# Patient Record
Sex: MALE | Race: WHITE | NOT HISPANIC OR LATINO | Employment: OTHER | URBAN - NONMETROPOLITAN AREA
[De-identification: names, ages, dates, MRNs, and addresses within clinical notes are randomized per-mention and may not be internally consistent; named-entity substitution may affect disease eponyms.]

---

## 2017-02-28 DIAGNOSIS — C61 PROSTATE CANCER (H): Primary | ICD-10-CM

## 2017-02-28 LAB — PSA SERPL-MCNC: 1.92 UG/L (ref 0–4)

## 2017-02-28 PROCEDURE — 84153 ASSAY OF PSA TOTAL: CPT | Performed by: UROLOGY

## 2017-02-28 PROCEDURE — 36415 COLL VENOUS BLD VENIPUNCTURE: CPT | Performed by: UROLOGY

## 2017-03-09 ENCOUNTER — TRANSFERRED RECORDS (OUTPATIENT)
Dept: HEALTH INFORMATION MANAGEMENT | Facility: HOSPITAL | Age: 67
End: 2017-03-09

## 2017-10-12 DIAGNOSIS — C61 PROSTATE CANCER (H): Primary | ICD-10-CM

## 2017-10-12 LAB — PSA SERPL-ACNC: 2.03 UG/L (ref 0–4)

## 2017-10-12 PROCEDURE — 84153 ASSAY OF PSA TOTAL: CPT | Performed by: UROLOGY

## 2017-10-19 ENCOUNTER — TRANSFERRED RECORDS (OUTPATIENT)
Dept: HEALTH INFORMATION MANAGEMENT | Facility: HOSPITAL | Age: 67
End: 2017-10-19

## 2017-11-30 ENCOUNTER — TRANSFERRED RECORDS (OUTPATIENT)
Dept: HEALTH INFORMATION MANAGEMENT | Facility: HOSPITAL | Age: 67
End: 2017-11-30

## 2018-05-08 ENCOUNTER — TRANSFERRED RECORDS (OUTPATIENT)
Dept: HEALTH INFORMATION MANAGEMENT | Facility: CLINIC | Age: 68
End: 2018-05-08

## 2018-06-04 DIAGNOSIS — C61 PROSTATE CANCER (H): Primary | ICD-10-CM

## 2018-06-04 LAB — PSA SERPL-ACNC: 2.33 UG/L (ref 0–4)

## 2018-06-04 PROCEDURE — 84153 ASSAY OF PSA TOTAL: CPT | Performed by: UROLOGY

## 2018-10-08 NOTE — PROGRESS NOTES
SUBJECTIVE:   Augie Hancock is a 67 year old male who presents for Preventive Visit.      Are you in the first 12 months of your Medicare Part B coverage?  No    Healthy Habits:    Do you get at least three servings of calcium containing foods daily (dairy, green leafy vegetables, etc.)? yes    Amount of exercise or daily activities, outside of work: 3 day(s) per week    Problems taking medications regularly No    Medication side effects: No    Have you had an eye exam in the past two years? yes    Do you see a dentist twice per year? yes    Do you have sleep apnea, excessive snoring or daytime drowsiness? snoring      Ability to successfully perform activities of daily living: Yes, no assistance needed    Home safety:  none identified     Hearing impairment: No    Fall risk:  Fallen 2 or more times in the past year?: No  Any fall with injury in the past year?: No        COGNITIVE SCREEN  1) Repeat 3 items (Leader, Season, Table)    2) Clock draw: NORMAL  3) 3 item recall: Recalls 1 object   Results: NORMAL clock, 1-2 items recalled: COGNITIVE IMPAIRMENT LESS LIKELY    Mini-CogTM Copyright CATALINA Vaz. Licensed by the author for use in [facility]; reprinted with permission (becky@[email or web address]). All rights reserved.              Reviewed and updated as needed this visit by clinical staff         Reviewed and updated as needed this visit by Provider        Social History   Substance Use Topics     Smoking status: Never Smoker     Smokeless tobacco: Current User     Types: Chew      Comment: no passive exposure     Alcohol use No       If you drink alcohol do you typically have >3 drinks per day or >7 drinks per week? No                        Today's PHQ-2 Score:   PHQ-2 ( 1999 Pfizer) 6/12/2015   Q1: Little interest or pleasure in doing things 0   Q2: Feeling down, depressed or hopeless 0   PHQ-2 Score 0       Do you feel safe in your environment - Yes    Do you have a Health Care Directive?: Yes: Patient  "states has Advance Directive and will bring in a copy to clinic.    Current providers sharing in care for this patient include:   Patient Care Team:  Jose Guadalupe Cotton MD as PCP - General    The following health maintenance items are reviewed in Epic and correct as of today:  Health Maintenance   Topic Date Due     PHQ-2 Q1 YR  12/10/1964     HIV SCREEN (SYSTEM ASSIGNED)  12/10/1970     HEPATITIS C SCREENING  12/10/1970     FALL RISK ASSESSMENT  12/10/2017     PNEUMOCOCCAL (1 of 2 - PCV13) 12/10/2017     AORTIC ANEURYSM SCREENING (SYSTEM ASSIGNED)  12/10/2017     INFLUENZA VACCINE (1) 09/01/2018     LIPID SCREEN Q5 YR MALE (SYSTEM ASSIGNED)  12/30/2020     ADVANCE DIRECTIVE PLANNING Q5 YRS  09/21/2021     COLONOSCOPY Q10 YR  12/17/2022     TETANUS IMMUNIZATION (SYSTEM ASSIGNED)  05/29/2025     Labs reviewed in EPIC        ROS:  CONSTITUTIONAL: NEGATIVE for fever, chills, change in weight  INTEGUMENTARY/SKIN: NEGATIVE for worrisome rashes, moles or lesions  EYES: NEGATIVE for vision changes or irritation  ENT/MOUTH: NEGATIVE for ear, mouth and throat problems  RESP: NEGATIVE for significant cough or SOB  BREAST: NEGATIVE for masses, tenderness or discharge  CV: NEGATIVE for chest pain, palpitations or peripheral edema  GI: NEGATIVE for nausea, abdominal pain, heartburn, or change in bowel habits  : NEGATIVE for frequency, dysuria, or hematuria  MUSCULOSKELETAL: NEGATIVE for significant arthralgias or myalgia  NEURO: NEGATIVE for weakness, dizziness or paresthesias  ENDOCRINE: NEGATIVE for temperature intolerance, skin/hair changes  HEME: NEGATIVE for bleeding problems  PSYCHIATRIC: NEGATIVE for changes in mood or affect    OBJECTIVE:   There were no vitals taken for this visit. Estimated body mass index is 21.3 kg/(m^2) as calculated from the following:    Height as of 9/21/16: 5' 5\" (1.651 m).    Weight as of 9/21/16: 128 lb (58.1 kg).  EXAM:   GENERAL: healthy, alert and no distress  EYES: Eyes grossly normal to " inspection, PERRL and conjunctivae and sclerae normal  HENT: ear canals and TM's normal, nose and mouth without ulcers or lesions  NECK: no adenopathy, no asymmetry, masses, or scars and thyroid normal to palpation  RESP: lungs clear to auscultation - no rales, rhonchi or wheezes  CV: regular rate and rhythm, normal S1 S2, no S3 or S4, no murmur, click or rub, no peripheral edema and peripheral pulses strong  ABDOMEN: soft, nontender, no hepatosplenomegaly, no masses and bowel sounds normal   (male): normal male genitalia without lesions or urethral discharge, no hernia  RECTAL: normal sphincter tone, no rectal masses and right sided prostate nodule noted and feels unchanged.  MS: no gross musculoskeletal defects noted, no edema  SKIN: no suspicious lesions or rashes  NEURO: Normal strength and tone, mentation intact and speech normal  PSYCH: mentation appears normal, affect normal/bright    Diagnostic Test Results:  none     ASSESSMENT / PLAN:       ICD-10-CM    1. Routine general medical examination at a health care facility Z00.00 Lipid Profile     Comprehensive metabolic panel     Prostate spec antigen screen     CBC with platelets and differential   2. Screening for AAA (abdominal aortic aneurysm) Z13.6 US Aorta Medicare AAA Screening   3. Screening for HIV (human immunodeficiency virus) Z11.4 HIV Antigen Antibody Combo   4. Need for hepatitis C screening test Z11.59 Hepatitis C Screen Reflex to HCV RNA Quant and Genotype   5. Prostate nodule N40.2    6. Need for prophylactic vaccination and inoculation against influenza Z23 HC FLU VACCINE, INCREASED ANTIGEN, PRESV FREE     ADMIN INFLUENZA (For MEDICARE Patients ONLY) []       End of Life Planning:  Patient currently has an advanced directive:     COUNSELING:  Reviewed preventive health counseling, as reflected in patient instructions    BP Readings from Last 1 Encounters:   09/21/16 100/58     Estimated body mass index is 21.3 kg/(m^2) as calculated  "from the following:    Height as of 9/21/16: 5' 5\" (1.651 m).    Weight as of 9/21/16: 128 lb (58.1 kg).           reports that he has never smoked. His smokeless tobacco use includes Chew.      Appropriate preventive services were discussed with this patient, including applicable screening as appropriate for cardiovascular disease, diabetes, osteopenia/osteoporosis, and glaucoma.  As appropriate for age/gender, discussed screening for colorectal cancer, prostate cancer, breast cancer, and cervical cancer. Checklist reviewing preventive services available has been given to the patient.    Reviewed patients plan of care and provided an AVS. The Basic Care Plan (routine screening as documented in Health Maintenance) for Augie meets the Care Plan requirement. This Care Plan has been established and reviewed with the Patient.    Counseling Resources:  ATP IV Guidelines  Pooled Cohorts Equation Calculator  Breast Cancer Risk Calculator  FRAX Risk Assessment  ICSI Preventive Guidelines  Dietary Guidelines for Americans, 2010  USDA's MyPlate  ASA Prophylaxis  Lung CA Screening    Jose Guadalupe Cotton MD  [facility]  "

## 2018-10-09 ENCOUNTER — OFFICE VISIT (OUTPATIENT)
Dept: FAMILY MEDICINE | Facility: OTHER | Age: 68
End: 2018-10-09
Attending: FAMILY MEDICINE
Payer: COMMERCIAL

## 2018-10-09 VITALS
DIASTOLIC BLOOD PRESSURE: 64 MMHG | HEART RATE: 62 BPM | WEIGHT: 131 LBS | SYSTOLIC BLOOD PRESSURE: 108 MMHG | BODY MASS INDEX: 21.83 KG/M2 | OXYGEN SATURATION: 98 % | HEIGHT: 65 IN | TEMPERATURE: 97.7 F

## 2018-10-09 DIAGNOSIS — Z23 NEED FOR PROPHYLACTIC VACCINATION AND INOCULATION AGAINST INFLUENZA: ICD-10-CM

## 2018-10-09 DIAGNOSIS — Z11.59 NEED FOR HEPATITIS C SCREENING TEST: ICD-10-CM

## 2018-10-09 DIAGNOSIS — N40.2 PROSTATE NODULE: ICD-10-CM

## 2018-10-09 DIAGNOSIS — Z00.00 ROUTINE GENERAL MEDICAL EXAMINATION AT A HEALTH CARE FACILITY: Primary | ICD-10-CM

## 2018-10-09 DIAGNOSIS — Z13.6 SCREENING FOR AAA (ABDOMINAL AORTIC ANEURYSM): ICD-10-CM

## 2018-10-09 DIAGNOSIS — Z11.4 SCREENING FOR HIV (HUMAN IMMUNODEFICIENCY VIRUS): ICD-10-CM

## 2018-10-09 LAB
ALBUMIN SERPL-MCNC: 4 G/DL (ref 3.4–5)
ALP SERPL-CCNC: 54 U/L (ref 40–150)
ALT SERPL W P-5'-P-CCNC: 16 U/L (ref 0–70)
ANION GAP SERPL CALCULATED.3IONS-SCNC: 7 MMOL/L (ref 3–14)
AST SERPL W P-5'-P-CCNC: 16 U/L (ref 0–45)
BASOPHILS # BLD AUTO: 0 10E9/L (ref 0–0.2)
BASOPHILS NFR BLD AUTO: 0.2 %
BILIRUB SERPL-MCNC: 0.6 MG/DL (ref 0.2–1.3)
BUN SERPL-MCNC: 19 MG/DL (ref 7–30)
CALCIUM SERPL-MCNC: 8.5 MG/DL (ref 8.5–10.1)
CHLORIDE SERPL-SCNC: 104 MMOL/L (ref 94–109)
CHOLEST SERPL-MCNC: 184 MG/DL
CO2 SERPL-SCNC: 28 MMOL/L (ref 20–32)
CREAT SERPL-MCNC: 0.83 MG/DL (ref 0.66–1.25)
DIFFERENTIAL METHOD BLD: NORMAL
EOSINOPHIL # BLD AUTO: 0.1 10E9/L (ref 0–0.7)
EOSINOPHIL NFR BLD AUTO: 1.8 %
ERYTHROCYTE [DISTWIDTH] IN BLOOD BY AUTOMATED COUNT: 13.1 % (ref 10–15)
GFR SERPL CREATININE-BSD FRML MDRD: >90 ML/MIN/1.7M2
GLUCOSE SERPL-MCNC: 86 MG/DL (ref 70–99)
HCT VFR BLD AUTO: 43.5 % (ref 40–53)
HDLC SERPL-MCNC: 66 MG/DL
HGB BLD-MCNC: 14.9 G/DL (ref 13.3–17.7)
LDLC SERPL CALC-MCNC: 104 MG/DL
LYMPHOCYTES # BLD AUTO: 1.2 10E9/L (ref 0.8–5.3)
LYMPHOCYTES NFR BLD AUTO: 23.5 %
MCH RBC QN AUTO: 30.3 PG (ref 26.5–33)
MCHC RBC AUTO-ENTMCNC: 34.3 G/DL (ref 31.5–36.5)
MCV RBC AUTO: 89 FL (ref 78–100)
MONOCYTES # BLD AUTO: 0.5 10E9/L (ref 0–1.3)
MONOCYTES NFR BLD AUTO: 10.3 %
NEUTROPHILS # BLD AUTO: 3.2 10E9/L (ref 1.6–8.3)
NEUTROPHILS NFR BLD AUTO: 64.2 %
NONHDLC SERPL-MCNC: 118 MG/DL
PLATELET # BLD AUTO: 201 10E9/L (ref 150–450)
POTASSIUM SERPL-SCNC: 4.1 MMOL/L (ref 3.4–5.3)
PROT SERPL-MCNC: 7 G/DL (ref 6.8–8.8)
PSA SERPL-ACNC: 2.32 UG/L (ref 0–4)
RBC # BLD AUTO: 4.91 10E12/L (ref 4.4–5.9)
SODIUM SERPL-SCNC: 139 MMOL/L (ref 133–144)
TRIGL SERPL-MCNC: 70 MG/DL
WBC # BLD AUTO: 5 10E9/L (ref 4–11)

## 2018-10-09 PROCEDURE — 80061 LIPID PANEL: CPT | Performed by: FAMILY MEDICINE

## 2018-10-09 PROCEDURE — 90471 IMMUNIZATION ADMIN: CPT | Performed by: FAMILY MEDICINE

## 2018-10-09 PROCEDURE — 85025 COMPLETE CBC W/AUTO DIFF WBC: CPT | Performed by: FAMILY MEDICINE

## 2018-10-09 PROCEDURE — 90662 IIV NO PRSV INCREASED AG IM: CPT | Performed by: FAMILY MEDICINE

## 2018-10-09 PROCEDURE — 86803 HEPATITIS C AB TEST: CPT | Mod: 90 | Performed by: FAMILY MEDICINE

## 2018-10-09 PROCEDURE — 87389 HIV-1 AG W/HIV-1&-2 AB AG IA: CPT | Mod: 90 | Performed by: FAMILY MEDICINE

## 2018-10-09 PROCEDURE — 84153 ASSAY OF PSA TOTAL: CPT | Performed by: FAMILY MEDICINE

## 2018-10-09 PROCEDURE — 99000 SPECIMEN HANDLING OFFICE-LAB: CPT | Performed by: FAMILY MEDICINE

## 2018-10-09 PROCEDURE — 99397 PER PM REEVAL EST PAT 65+ YR: CPT | Mod: 25 | Performed by: FAMILY MEDICINE

## 2018-10-09 PROCEDURE — 80053 COMPREHEN METABOLIC PANEL: CPT | Performed by: FAMILY MEDICINE

## 2018-10-09 PROCEDURE — 36415 COLL VENOUS BLD VENIPUNCTURE: CPT | Performed by: FAMILY MEDICINE

## 2018-10-09 ASSESSMENT — ANXIETY QUESTIONNAIRES
GAD7 TOTAL SCORE: 0
1. FEELING NERVOUS, ANXIOUS, OR ON EDGE: NOT AT ALL
6. BECOMING EASILY ANNOYED OR IRRITABLE: NOT AT ALL
2. NOT BEING ABLE TO STOP OR CONTROL WORRYING: NOT AT ALL
7. FEELING AFRAID AS IF SOMETHING AWFUL MIGHT HAPPEN: NOT AT ALL
5. BEING SO RESTLESS THAT IT IS HARD TO SIT STILL: NOT AT ALL
3. WORRYING TOO MUCH ABOUT DIFFERENT THINGS: NOT AT ALL

## 2018-10-09 ASSESSMENT — PAIN SCALES - GENERAL: PAINLEVEL: NO PAIN (0)

## 2018-10-09 ASSESSMENT — PATIENT HEALTH QUESTIONNAIRE - PHQ9: 5. POOR APPETITE OR OVEREATING: NOT AT ALL

## 2018-10-09 NOTE — NURSING NOTE
"Chief Complaint   Patient presents with     Physical       Initial /64  Pulse 62  Temp 97.7  F (36.5  C)  Ht 5' 5\" (1.651 m)  Wt 131 lb (59.4 kg)  SpO2 98%  BMI 21.8 kg/m2 Estimated body mass index is 21.8 kg/(m^2) as calculated from the following:    Height as of this encounter: 5' 5\" (1.651 m).    Weight as of this encounter: 131 lb (59.4 kg).  Medication Reconciliation: complete    Julia Cardoza LPN  "

## 2018-10-09 NOTE — LETTER
October 12, 2018      Augie Hancock  [address]  [place] [address]        Dear ,    We are writing to inform you of your test results.    Your test results fall within the expected range(s) or remain unchanged from previous results.  Please continue with current treatment plan.    Resulted Orders   Lipid Profile   Result Value Ref Range    Cholesterol 184 <200 mg/dL    Triglycerides 70 <150 mg/dL      Comment:      Fasting specimen    HDL Cholesterol 66 >39 mg/dL    LDL Cholesterol Calculated 104 (H) <100 mg/dL      Comment:      Above desirable:  100-129 mg/dl  Borderline High:  130-159 mg/dL  High:             160-189 mg/dL  Very high:       >189 mg/dl      Non HDL Cholesterol 118 <130 mg/dL   Comprehensive metabolic panel   Result Value Ref Range    Sodium 139 133 - 144 mmol/L    Potassium 4.1 3.4 - 5.3 mmol/L    Chloride 104 94 - 109 mmol/L    Carbon Dioxide 28 20 - 32 mmol/L    Anion Gap 7 3 - 14 mmol/L    Glucose 86 70 - 99 mg/dL      Comment:      Fasting specimen    Urea Nitrogen 19 7 - 30 mg/dL    Creatinine 0.83 0.66 - 1.25 mg/dL    GFR Estimate >90 >60 mL/min/1.7m2      Comment:      Non  GFR Calc    GFR Estimate If Black >90 >60 mL/min/1.7m2      Comment:       GFR Calc    Calcium 8.5 8.5 - 10.1 mg/dL    Bilirubin Total 0.6 0.2 - 1.3 mg/dL    Albumin 4.0 3.4 - 5.0 g/dL    Protein Total 7.0 6.8 - 8.8 g/dL    Alkaline Phosphatase 54 40 - 150 U/L    ALT 16 0 - 70 U/L    AST 16 0 - 45 U/L   Hepatitis C Screen Reflex to HCV RNA Quant and Genotype   Result Value Ref Range    Hepatitis C Antibody Nonreactive NR^Nonreactive      Comment:      Assay performance characteristics have not been established for newborns,   infants, and children     HIV Antigen Antibody Combo   Result Value Ref Range    HIV Antigen Antibody Combo Nonreactive NR^Nonreactive          Comment:      HIV-1 p24 Ag & HIV-1/HIV-2 Ab Not Detected   Prostate spec antigen screen   Result Value  Ref Range    PSA 2.32 0 - 4 ug/L      Comment:      Assay Method:  Chemiluminescence using Siemens Vista analyzer   CBC with platelets and differential   Result Value Ref Range    WBC 5.0 4.0 - 11.0 10e9/L    RBC Count 4.91 4.4 - 5.9 10e12/L    Hemoglobin 14.9 13.3 - 17.7 g/dL    Hematocrit 43.5 40.0 - 53.0 %    MCV 89 78 - 100 fl    MCH 30.3 26.5 - 33.0 pg    MCHC 34.3 31.5 - 36.5 g/dL    RDW 13.1 10.0 - 15.0 %    Platelet Count 201 150 - 450 10e9/L    % Neutrophils 64.2 %    % Lymphocytes 23.5 %    % Monocytes 10.3 %    % Eosinophils 1.8 %    % Basophils 0.2 %    Absolute Neutrophil 3.2 1.6 - 8.3 10e9/L    Absolute Lymphocytes 1.2 0.8 - 5.3 10e9/L    Absolute Monocytes 0.5 0.0 - 1.3 10e9/L    Absolute Eosinophils 0.1 0.0 - 0.7 10e9/L    Absolute Basophils 0.0 0.0 - 0.2 10e9/L    Diff Method Automated Method        If you have any questions or concerns, please call the clinic at the number listed above.       Sincerely,        Jose Guadalupe Cotton MD

## 2018-10-09 NOTE — MR AVS SNAPSHOT
After Visit Summary   10/9/2018    Augie Hancock    MRN: [id number]           Patient Information     Date Of Birth          1950        Visit Information        Provider Department      10/9/2018 10:30 AM Jose Guadalupe Cotton MD [facility]        Today's Diagnoses     Routine general medical examination at a health care facility    -  1    Screening for AAA (abdominal aortic aneurysm)        Screening for HIV (human immunodeficiency virus)        Need for hepatitis C screening test        Prostate nodule        Need for prophylactic vaccination and inoculation against influenza          Care Instructions      Preventive Health Recommendations:   Male Ages 65 and over    Yearly exam:             See your health care provider every year in order to  o   Review health changes.   o   Discuss preventive care.    o   Review your medicines if your doctor has prescribed any.    Talk with your health care provider about whether you should have a test to screen for prostate cancer (PSA).    Every 3 years, have a diabetes test (fasting glucose). If you are at risk for diabetes, you should have this test more often.    Every 5 years, have a cholesterol test. Have this test more often if you are at risk for high cholesterol or heart disease.     Every 10 years, have a colonoscopy. Or, have a yearly FIT test (stool test). These exams will check for colon cancer.    Talk to with your health care provider about screening for Abdominal Aortic Aneurysm if you have a family history of AAA or have a history of smoking.    Shots:     Get a flu shot each year.     Get a tetanus shot every 10 years.     Talk to your doctor about your pneumonia vaccines. There are now two you should receive - Pneumovax (PPSV 23) and Prevnar (PCV 13).     Talk to your pharmacist about a shingles vaccine.     Talk to your doctor about the hepatitis B vaccine.  Nutrition:     Eat at least 5 servings of fruits and  vegetables each day.     Eat whole-grain bread, whole-wheat pasta and brown rice instead of white grains and rice.     Get adequate Calcium and Vitamin D.   Lifestyle    Exercise for at least 150 minutes a week (30 minutes a day, 5 days a week). This will help you control your weight and prevent disease.     Limit alcohol to one drink per day.     No smoking.     Wear sunscreen to prevent skin cancer.     See your dentist every six months for an exam and cleaning.     See your eye doctor every 1 to 2 years to screen for conditions such as glaucoma, macular degeneration, cataracts, etc           Follow-ups after your visit        Your next 10 appointments already scheduled     Oct 17, 2018  9:00 AM CDT   (Arrive by 8:45 AM)   US AORTA MEDICARE AAA SCREENING with HIUS1   [facility] ULTRASOUND ([facility] )    [address] [place] [address]   [phone]           How do I prepare for my exam? (Food and drink instructions) Adults: No eating, smoking, gum chewing or drinking for 8 hours before the exam. You may take medicine with a small sip of water.  Children: * Infants, breast-fed: may have breast milk up to 2 hours before exam. * Infants, formula: may have bottle until 4 hours before exam. * Children 1-5 years: No food or drink for 4 hours before exam. * Children 6 -12 years: No food or drink for 6 hours before exam. * Children over 12 years: No food or drink for 8 hours before exam.  * J Tube Fed: No need to stop feedings.  What should I wear: Wear comfortable clothes.  How long does the exam take: Most ultrasounds take 30 to 60 minutes.  What should I bring: Bring a list of your medicines, including vitamins, minerals and over-the-counter drugs. It is safest to leave personal items at home.  Do I need a :  No  is needed.  What do I need to tell my doctor: Tell your doctor about any allergies you may have.  What should I do after the exam: No restrictions, You may resume normal activities.  " What is this test: An ultrasound uses sound waves to make pictures of the body. Sound waves do not cause pain. The only discomfort may be the pressure of the wand against your skin or full bladder.  Who should I call with questions: If you have any questions, please call the Imaging Department where you will have your exam. Directions, parking instructions, and other information is available on our website, [email or web address].              Future tests that were ordered for you today     Open Future Orders        Priority Expected Expires Ordered    US Aorta Medicare AAA Screening Routine  10/9/2019 10/9/2018            Who to contact     If you have questions or need follow up information about today's clinic visit or your schedule please contact [facility] directly at [phone].  Normal or non-critical lab and imaging results will be communicated to you by MyChart, letter or phone within 4 business days after the clinic has received the results. If you do not hear from us within 7 days, please contact the clinic through MyChart or phone. If you have a critical or abnormal lab result, we will notify you by phone as soon as possible.  Submit refill requests through [employer] or call your pharmacy and they will forward the refill request to us. Please allow 3 business days for your refill to be completed.          Additional Information About Your Visit        Care EveryWhere ID     This is your Care EveryWhere ID. This could be used by other organizations to access your [facility] medical records  [id number]        Your Vitals Were     Pulse Temperature Height Pulse Oximetry BMI (Body Mass Index)       62 97.7  F (36.5  C) 5' 5\" (1.651 m) 98% 21.8 kg/m2        Blood Pressure from Last 3 Encounters:   10/09/18 108/64   09/21/16 100/58   09/15/16 104/58    Weight from Last 3 Encounters:   10/09/18 131 lb (59.4 kg)   09/21/16 128 lb (58.1 kg)   09/15/16 137 lb (62.1 kg)              We " Performed the Following     ADMIN INFLUENZA (For MEDICARE Patients ONLY) []     CBC with platelets and differential     Comprehensive metabolic panel     HC FLU VACCINE, INCREASED ANTIGEN, PRESV FREE     Hepatitis C Screen Reflex to HCV RNA Quant and Genotype     HIV Antigen Antibody Combo     Lipid Profile     Prostate spec antigen screen          Today's Medication Changes          These changes are accurate as of 10/9/18 12:49 PM.  If you have any questions, ask your nurse or doctor.               Stop taking these medicines if you haven't already. Please contact your care team if you have questions.     Cranberry 1000 MG Caps   Stopped by:  Jose Guadalupe Cotton MD           diclofenac sodium 3 % Gel   Stopped by:  Jose Guadalupe Cotton MD           fish oil-omega-3 fatty acids 1000 MG capsule   Stopped by:  Jose Guadalupe Cotton MD                    Primary Care Provider Office Phone # Fax #    Jose Guadalupe Cotton [phone]       [address] [place] [address]        Equal Access to Services     [facility]: Hadii aad ku hadasho Soomaali, waaxda luqadaha, qaybta kaalmada adeegyada, waxay idiin hayaan adelcuina kharaeileen corcoran . So [facility] [phone].    ATENCIÓN: Si habla español, tiene a larry disposición servicios gratuitos de asistencia lingüística. Llame al [phone].    We comply with applicable federal civil rights laws and [place] laws. We do not discriminate on the basis of race, color, national origin, age, disability, sex, sexual orientation, or gender identity.            Thank you!     Thank you for choosing [facility]  for your care. Our goal is always to provide you with excellent care. Hearing back from our patients is one way we can continue to improve our services. Please take a few minutes to complete the written survey that you may receive in the mail after your visit with us. Thank you!             Your Updated Medication List - Protect others  around you: Learn how to safely use, store and throw away your medicines at www.disposemymeds.org.          This list is accurate as of 10/9/18 12:49 PM.  Always use your most recent med list.                   Brand Name Dispense Instructions for use Diagnosis    ASPIRIN PO      Take 81 mg by mouth daily        GLUCOSAMINE-CHONDROIT-MSM-C-MN PO      Take 2 tablets by mouth daily [id number]-[id number] Take one by mouth daily        Multiple vitamin Tabs      Take 1 tablet by mouth daily. With food

## 2018-10-10 ASSESSMENT — PATIENT HEALTH QUESTIONNAIRE - PHQ9: SUM OF ALL RESPONSES TO PHQ QUESTIONS 1-9: 2

## 2018-10-10 ASSESSMENT — ANXIETY QUESTIONNAIRES: GAD7 TOTAL SCORE: 0

## 2018-10-11 LAB
HCV AB SERPL QL IA: NONREACTIVE
HIV 1+2 AB+HIV1 P24 AG SERPL QL IA: NONREACTIVE

## 2018-10-17 ENCOUNTER — HOSPITAL ENCOUNTER (OUTPATIENT)
Dept: ULTRASOUND IMAGING | Facility: HOSPITAL | Age: 68
Discharge: HOME | End: 2018-10-17
Attending: FAMILY MEDICINE | Admitting: FAMILY MEDICINE
Payer: COMMERCIAL

## 2018-10-17 DIAGNOSIS — Z13.6 SCREENING FOR AAA (ABDOMINAL AORTIC ANEURYSM): ICD-10-CM

## 2018-10-17 PROCEDURE — 76706 US ABDL AORTA SCREEN AAA: CPT | Mod: TC

## 2019-01-07 DIAGNOSIS — R97.20 ELEVATED PSA: Primary | ICD-10-CM

## 2019-01-07 LAB — PSA SERPL-MCNC: 2.07 UG/L (ref 0–4)

## 2019-01-07 PROCEDURE — 84153 ASSAY OF PSA TOTAL: CPT | Mod: ZL | Performed by: UROLOGY

## 2019-01-07 PROCEDURE — 36415 COLL VENOUS BLD VENIPUNCTURE: CPT | Mod: ZL | Performed by: UROLOGY

## 2019-02-26 ENCOUNTER — TRANSFERRED RECORDS (OUTPATIENT)
Dept: HEALTH INFORMATION MANAGEMENT | Facility: CLINIC | Age: 69
End: 2019-02-26

## 2019-03-01 ENCOUNTER — HOSPITAL ENCOUNTER (OUTPATIENT)
Dept: MRI IMAGING | Facility: HOSPITAL | Age: 69
Discharge: HOME | End: 2019-03-01
Attending: ORTHOPAEDIC SURGERY | Admitting: ORTHOPAEDIC SURGERY
Payer: COMMERCIAL

## 2019-03-01 DIAGNOSIS — M25.512 LEFT SHOULDER PAIN: ICD-10-CM

## 2019-03-01 PROCEDURE — 73221 MRI JOINT UPR EXTREM W/O DYE: CPT | Mod: TC,LT

## 2019-03-06 ENCOUNTER — TRANSFERRED RECORDS (OUTPATIENT)
Dept: HEALTH INFORMATION MANAGEMENT | Facility: CLINIC | Age: 69
End: 2019-03-06

## 2019-03-07 ENCOUNTER — TELEPHONE (OUTPATIENT)
Dept: FAMILY MEDICINE | Facility: OTHER | Age: 69
End: 2019-03-07

## 2019-03-07 NOTE — TELEPHONE ENCOUNTER
I spoke with pt and he would like his last EMG sent to Dr Becerra.  Pt was transferred to [facility] to set that up.

## 2019-03-07 NOTE — TELEPHONE ENCOUNTER
10:17 AM    Reason for Call: Phone Call    Description: Patient called and states that he will be having surgery with [facility] soon and patient states that he would like to rediscuss the caparal tunnel release  Option. patient would like to have PCP nurse give him a call back in regards to this. Please advise.     Was an appointment offered for this call? No  If yes : Appointment type              Date    Preferred method for responding to this message: Telephone Call  What is your phone number ?    If we cannot reach you directly, may we leave a detailed response at the number you provided? Yes    Can this message wait until your PCP/provider returns, if available today? Not applicable    Maribell Sarmiento MA

## 2019-04-17 ENCOUNTER — TRANSFERRED RECORDS (OUTPATIENT)
Dept: HEALTH INFORMATION MANAGEMENT | Facility: CLINIC | Age: 69
End: 2019-04-17

## 2019-04-24 ENCOUNTER — APPOINTMENT (OUTPATIENT)
Dept: GENERAL RADIOLOGY | Facility: HOSPITAL | Age: 69
End: 2019-04-24
Attending: NURSE PRACTITIONER
Payer: COMMERCIAL

## 2019-04-24 ENCOUNTER — HOSPITAL ENCOUNTER (EMERGENCY)
Facility: HOSPITAL | Age: 69
Discharge: HOME | End: 2019-04-24
Attending: NURSE PRACTITIONER | Admitting: NURSE PRACTITIONER
Payer: COMMERCIAL

## 2019-04-24 VITALS
SYSTOLIC BLOOD PRESSURE: 103 MMHG | RESPIRATION RATE: 19 BRPM | OXYGEN SATURATION: 95 % | TEMPERATURE: 97.4 F | DIASTOLIC BLOOD PRESSURE: 57 MMHG | HEART RATE: 68 BPM

## 2019-04-24 DIAGNOSIS — J18.9 LLL PNEUMONIA: ICD-10-CM

## 2019-04-24 LAB
BASOPHILS # BLD AUTO: 0 10E9/L (ref 0–0.2)
BASOPHILS NFR BLD AUTO: 0.2 %
DIFFERENTIAL METHOD BLD: ABNORMAL
EOSINOPHIL # BLD AUTO: 0 10E9/L (ref 0–0.7)
EOSINOPHIL NFR BLD AUTO: 0 %
ERYTHROCYTE [DISTWIDTH] IN BLOOD BY AUTOMATED COUNT: 13.1 % (ref 10–15)
HCT VFR BLD AUTO: 38.3 % (ref 40–53)
HGB BLD-MCNC: 13.5 G/DL (ref 13.3–17.7)
IMM GRANULOCYTES # BLD: 0 10E9/L (ref 0–0.4)
IMM GRANULOCYTES NFR BLD: 0.3 %
LYMPHOCYTES # BLD AUTO: 0.7 10E9/L (ref 0.8–5.3)
LYMPHOCYTES NFR BLD AUTO: 6.2 %
MCH RBC QN AUTO: 30.1 PG (ref 26.5–33)
MCHC RBC AUTO-ENTMCNC: 35.2 G/DL (ref 31.5–36.5)
MCV RBC AUTO: 86 FL (ref 78–100)
MONOCYTES # BLD AUTO: 0.8 10E9/L (ref 0–1.3)
MONOCYTES NFR BLD AUTO: 6.8 %
NEUTROPHILS # BLD AUTO: 10.1 10E9/L (ref 1.6–8.3)
NEUTROPHILS NFR BLD AUTO: 86.5 %
NRBC # BLD AUTO: 0 10*3/UL
NRBC BLD AUTO-RTO: 0 /100
PLATELET # BLD AUTO: 157 10E9/L (ref 150–450)
RBC # BLD AUTO: 4.48 10E12/L (ref 4.4–5.9)
WBC # BLD AUTO: 11.7 10E9/L (ref 4–11)

## 2019-04-24 PROCEDURE — 36415 COLL VENOUS BLD VENIPUNCTURE: CPT | Performed by: NURSE PRACTITIONER

## 2019-04-24 PROCEDURE — 71046 X-RAY EXAM CHEST 2 VIEWS: CPT | Mod: TC

## 2019-04-24 PROCEDURE — 96372 THER/PROPH/DIAG INJ SC/IM: CPT

## 2019-04-24 PROCEDURE — 25000125 ZZHC RX 250: Performed by: NURSE PRACTITIONER

## 2019-04-24 PROCEDURE — 85025 COMPLETE CBC W/AUTO DIFF WBC: CPT | Performed by: NURSE PRACTITIONER

## 2019-04-24 PROCEDURE — 40000275 ZZH STATISTIC RCP TIME EA 10 MIN

## 2019-04-24 PROCEDURE — G0463 HOSPITAL OUTPT CLINIC VISIT: HCPCS | Mod: 25

## 2019-04-24 PROCEDURE — 94640 AIRWAY INHALATION TREATMENT: CPT

## 2019-04-24 PROCEDURE — 25000128 H RX IP 250 OP 636: Performed by: NURSE PRACTITIONER

## 2019-04-24 PROCEDURE — 99214 OFFICE O/P EST MOD 30 MIN: CPT | Performed by: NURSE PRACTITIONER

## 2019-04-24 RX ORDER — IPRATROPIUM BROMIDE AND ALBUTEROL SULFATE 2.5; .5 MG/3ML; MG/3ML
3 SOLUTION RESPIRATORY (INHALATION) ONCE
Status: COMPLETED | OUTPATIENT
Start: 2019-04-24 | End: 2019-04-24

## 2019-04-24 RX ORDER — BENZONATATE 100 MG/1
100 CAPSULE ORAL 3 TIMES DAILY PRN
Qty: 30 CAPSULE | Refills: 0 | Status: SHIPPED | OUTPATIENT
Start: 2019-04-24 | End: 2020-01-30

## 2019-04-24 RX ORDER — CEFTRIAXONE SODIUM 1 G
1 VIAL (EA) INJECTION ONCE
Status: COMPLETED | OUTPATIENT
Start: 2019-04-24 | End: 2019-04-24

## 2019-04-24 RX ORDER — ALBUTEROL SULFATE 90 UG/1
2 AEROSOL, METERED RESPIRATORY (INHALATION) EVERY 6 HOURS PRN
Qty: 18 G | Refills: 0 | Status: SHIPPED | OUTPATIENT
Start: 2019-04-24 | End: 2020-01-30

## 2019-04-24 RX ADMIN — IPRATROPIUM BROMIDE AND ALBUTEROL SULFATE 3 ML: .5; 3 SOLUTION RESPIRATORY (INHALATION) at 09:40

## 2019-04-24 RX ADMIN — CEFTRIAXONE SODIUM 1 G: 1 INJECTION, POWDER, FOR SOLUTION INTRAMUSCULAR; INTRAVENOUS at 10:14

## 2019-04-24 ASSESSMENT — ENCOUNTER SYMPTOMS
TROUBLE SWALLOWING: 0
NECK PAIN: 0
RHINORRHEA: 0
WHEEZING: 1
ABDOMINAL PAIN: 0
SORE THROAT: 0
WEAKNESS: 0
SINUS PAIN: 0
STRIDOR: 0
CHILLS: 1
ACTIVITY CHANGE: 0
DIARRHEA: 0
PSYCHIATRIC NEGATIVE: 1
NAUSEA: 0
DYSURIA: 0
FEVER: 0
SHORTNESS OF BREATH: 0
NECK STIFFNESS: 0
VOMITING: 0
COUGH: 1
NUMBNESS: 0
SINUS PRESSURE: 0
APPETITE CHANGE: 1

## 2019-04-24 NOTE — DISCHARGE INSTRUCTIONS
Take antibiotics as ordered.   Eat a yogurt daily while taking antibiotics.   Take Tessalon as needed as directed for cough.   Use Albuterol inhaler as needed as directed for cough.   Increase fluid intake.   Rest.   Follow up with PCP in 1 week for re-evaluation.   Return to urgent care or emergency department with any increase in symptoms or concerns.

## 2019-04-24 NOTE — ED NOTES
Pt presents with productive cough x's 4 days, worse last night. States ribs are hurting from coughing. Denies any other sx's besides cough.

## 2019-04-24 NOTE — ED AVS SNAPSHOT
[facility]  [address] [address]  Phone:  [phone]                                    Augie Hancock   MRN: [id number]    Department:  [facility]   Date of Visit:  4/24/2019           After Visit Summary Signature Page    I have received my discharge instructions, and my questions have been answered. I have discussed any challenges I see with this plan with the nurse or doctor.    ..........................................................................................................................................  Patient/Patient Representative Signature      ..........................................................................................................................................  Patient Representative Print Name and Relationship to Patient    ..................................................               ................................................  Date                                   Time    ..........................................................................................................................................  Reviewed by Signature/Title    ...................................................              ..............................................  Date                                               Time          22EPIC Rev 08/18

## 2019-04-24 NOTE — ED PROVIDER NOTES
History     Chief Complaint   Patient presents with     Cough     for 4 days     The history is provided by the patient. No  was used.     Augie Hancock is a 68 year old male who presents with a cough. He's taken Darlene [place] gel capsules with mild effectiveness. Denies fever or night sweats. Positive for chills. Drinking well. Decreased appetite. Bowel and bladder are working well. No antibiotic use in the past 30 days. He is a tobacco user and uses 1 tin of chewing tobacco every 3 days.       Allergies:  No Known Allergies    Problem List:    Patient Active Problem List    Diagnosis Date Noted     Advanced directives, counseling/discussion 09/21/2016     Priority: Medium     Advance Care Planning 9/21/2016: ACP Review of Chart / Resources Provided:  Reviewed chart for advance care plan.  Augie Hancock has no plan or code status on file. Discussed available resources and provided with information. Pt states he has paperwork at home.  Added by Shena Singletary             ACP (advance care planning) 09/15/2016     Priority: Medium     Advance Care Planning 9/15/2016: ACP Review of Chart / Resources Provided:  Reviewed chart for advance care plan.  Augie Hancock has been provided information and resources to begin or update their advance care plan.  Added by Pilar Carbone             Prostate nodule 09/15/2016     Priority: Medium     Osteoarthrosis involving lower leg 05/06/2011     Priority: Medium     Overview:   IMO Update  IMO Update 10 2016          Past Medical History:    Past Medical History:   Diagnosis Date     Femoral fracture 4/18/2011     OA (osteoarthritis) of knee 4/18/2011     Routine general medical examination at a health care facility 5/7/2007     Tibia/fibula fracture 4/18/2011       Past Surgical History:    Past Surgical History:   Procedure Laterality Date     COLONOSCOPY  12/17/2012    Repeat in 10 years     COLONOSCOPY  2004     JOINT REPLACEMENT  4/2011      leg fracture repair secondary to trauma      bilateral     RELEASE CARPAL TUNNEL Right 4/7/2016    Procedure: RELEASE CARPAL TUNNEL;  Surgeon: Moise Izaguirre MD;  Location: [facility]       Family History:    Family History   Problem Relation Age of Onset     Cerebrovascular Disease Father      Heart Disease Father 70        MI; cause of death     Coronary Artery Disease Other      Diabetes Brother         type 2     Diabetes Mother         type 2     Diabetes Sister         type 2     Coronary Artery Disease Sister 64        MI       Social History:  Marital Status:   [2]  Social History     Tobacco Use     Smoking status: Never Smoker     Smokeless tobacco: Current User     Types: Chew     Tobacco comment: no passive exposure   Substance Use Topics     Alcohol use: No     Alcohol/week: 0.0 oz     Drug use: None        Medications:      albuterol (PROAIR HFA/PROVENTIL HFA/VENTOLIN HFA) 108 (90 Base) MCG/ACT inhaler   amoxicillin-clavulanate (AUGMENTIN) 875-125 MG tablet   ASPIRIN PO   benzonatate (TESSALON) 100 MG capsule   GLUCOSAMINE-CHONDROIT-MSM-C-MN PO   Multiple vitamin TABS         Review of Systems   Constitutional: Positive for appetite change and chills. Negative for activity change and fever.   HENT: Positive for congestion. Negative for ear discharge, ear pain, rhinorrhea, sinus pressure, sinus pain, sore throat and trouble swallowing.    Respiratory: Positive for cough and wheezing. Negative for shortness of breath and stridor.         Lower rib pain from coughing.    Cardiovascular: Negative for chest pain.   Gastrointestinal: Negative for abdominal pain, diarrhea, nausea and vomiting.   Genitourinary: Negative for dysuria.   Musculoskeletal: Negative for neck pain and neck stiffness.   Skin: Negative for rash.   Neurological: Negative for weakness and numbness.   Psychiatric/Behavioral: Negative.        Physical Exam   BP: 103/57  Pulse: 68  Temp: 97.4  F (36.3  C)  Resp: 19  SpO2: 95  %      Physical Exam   Constitutional: He is oriented to person, place, and time. He appears well-developed and well-nourished. No distress.   HENT:   Head: Normocephalic.   Right Ear: External ear normal.   Left Ear: External ear normal.   Mouth/Throat: Oropharynx is clear and moist. No oropharyngeal exudate.   Neck: Normal range of motion. Neck supple.   Cardiovascular: Normal rate, regular rhythm and normal heart sounds.   No murmur heard.  Pulmonary/Chest: Effort normal. No stridor. No respiratory distress. He has wheezes. He has rales.   Crackles to bilateral bases.    Abdominal: Soft. He exhibits no distension.   Musculoskeletal: Normal range of motion.   Lymphadenopathy:     He has no cervical adenopathy.   Neurological: He is alert and oriented to person, place, and time. He exhibits normal muscle tone.   Skin: Skin is warm and dry. Capillary refill takes less than 2 seconds. No rash noted. He is not diaphoretic.   Psychiatric: He has a normal mood and affect. His behavior is normal.   Nursing note and vitals reviewed.      ED Course     Procedures    I personally reviewed the x-rays and there is a LLL infiltrate. Radiology review pending and nurse will notify patient if there is any change in the treatment plan.    Results for orders placed or performed during the hospital encounter of 04/24/19   Chest XR,  PA & LAT    Narrative    PROCEDURE:  XR CHEST 2 VW    HISTORY:  Cough. Crackles in bases..     COMPARISON:  April 2018    FINDINGS:   The cardiac silhouette is normal in size. The pulmonary vasculature is  normal.  There is a left lower lobe infiltrate suspicious for  pneumonia. There is some interstitial thickening seen at the right  lung base. Calcified mediastinal lymph nodes are noted. No pleural  effusion or pneumothorax.      Impression    IMPRESSION:  Left lower lobe infiltrate suspicious for pneumonia      BENEDICT TELLEZ MD   CBC with platelets differential   Result Value Ref Range    WBC 11.7  (H) 4.0 - 11.0 10e9/L    RBC Count 4.48 4.4 - 5.9 10e12/L    Hemoglobin 13.5 13.3 - 17.7 g/dL    Hematocrit 38.3 (L) 40.0 - 53.0 %    MCV 86 78 - 100 fl    MCH 30.1 26.5 - 33.0 pg    MCHC 35.2 31.5 - 36.5 g/dL    RDW 13.1 10.0 - 15.0 %    Platelet Count 157 150 - 450 10e9/L    Diff Method Automated Method     % Neutrophils 86.5 %    % Lymphocytes 6.2 %    % Monocytes 6.8 %    % Eosinophils 0.0 %    % Basophils 0.2 %    % Immature Granulocytes 0.3 %    Nucleated RBCs 0 0 /100    Absolute Neutrophil 10.1 (H) 1.6 - 8.3 10e9/L    Absolute Lymphocytes 0.7 (L) 0.8 - 5.3 10e9/L    Absolute Monocytes 0.8 0.0 - 1.3 10e9/L    Absolute Eosinophils 0.0 0.0 - 0.7 10e9/L    Absolute Basophils 0.0 0.0 - 0.2 10e9/L    Abs Immature Granulocytes 0.0 0 - 0.4 10e9/L    Absolute Nucleated RBC 0.0      Medications   ipratropium - albuterol 0.5 mg/2.5 mg/3 mL (DUONEB) neb solution 3 mL (3 mLs Nebulization Given 4/24/19 0940)   cefTRIAXone (ROCEPHIN) injection 1 g (1 g Intramuscular Given 4/24/19 1014)     Monitored for 20 minutes and tolerated well.     Assessments & Plan (with Medical Decision Making)     Will treat with Rocephin and Augmentin for LLL pneumonia.     Encouraged him to follow up with PCP in 1 week for re-evaluation.     Discussed plan of care. He verbalized understanding. All questions answered.     I have reviewed the nursing notes.    I have reviewed the findings, diagnosis, plan and need for follow up with the patient.  Discharged in stable condition.        Medication List      Started    albuterol 108 (90 Base) MCG/ACT inhaler  Commonly known as:  PROAIR HFA/PROVENTIL HFA/VENTOLIN HFA  2 puffs, Inhalation, EVERY 6 HOURS PRN     amoxicillin-clavulanate 875-125 MG tablet  Commonly known as:  AUGMENTIN  1 tablet, Oral, 2 TIMES DAILY     benzonatate 100 MG capsule  Commonly known as:  TESSALON  100 mg, Oral, 3 TIMES DAILY PRN            Final diagnoses:   LLL pneumonia (H)      Take antibiotics as ordered.   Eat a yogurt  daily while taking antibiotics.   Take Tessalon as needed as directed for cough.   Use Albuterol inhaler as needed as directed for cough.   Increase fluid intake.   Rest.   Follow up with PCP in 1 week for re-evaluation.   Return to urgent care or emergency department with any increase in symptoms or concerns.     NIGEL Vázquez  4/24/2019  9:25 AM  [facility]       Clarissa Aguillon NP  04/24/19 [id number]

## 2019-04-25 NOTE — PATIENT INSTRUCTIONS
Before Your Surgery      Call your surgeon if there is any change in your health. This includes signs of a cold or flu (such as a sore throat, runny nose, cough, rash or fever).    Do not smoke, drink alcohol or take over the counter medicine (unless your surgeon or primary care doctor tells you to) for the 24 hours before and after surgery.    If you take prescribed drugs: Follow your doctor s orders about which medicines to take and which to stop until after surgery.    Eating and drinking prior to surgery: follow the instructions from your surgeon    Take a shower or bath the night before surgery. Use the soap your surgeon gave you to gently clean your skin. If you do not have soap from your surgeon, use your regular soap. Do not shave or scrub the surgery site.  Wear clean pajamas and have clean sheets on your bed. F/u with ongoing concerns.
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
## 2019-04-30 ENCOUNTER — OFFICE VISIT (OUTPATIENT)
Dept: FAMILY MEDICINE | Facility: OTHER | Age: 69
End: 2019-04-30
Attending: FAMILY MEDICINE
Payer: COMMERCIAL

## 2019-04-30 VITALS
OXYGEN SATURATION: 97 % | SYSTOLIC BLOOD PRESSURE: 120 MMHG | DIASTOLIC BLOOD PRESSURE: 62 MMHG | HEART RATE: 65 BPM | BODY MASS INDEX: 21.97 KG/M2 | WEIGHT: 132 LBS

## 2019-04-30 DIAGNOSIS — J18.9 PNEUMONIA OF LEFT LOWER LOBE DUE TO INFECTIOUS ORGANISM: ICD-10-CM

## 2019-04-30 DIAGNOSIS — Z01.818 PREOP GENERAL PHYSICAL EXAM: Primary | ICD-10-CM

## 2019-04-30 DIAGNOSIS — M75.102 ROTATOR CUFF SYNDROME, LEFT: ICD-10-CM

## 2019-04-30 DIAGNOSIS — G56.02 LEFT CARPAL TUNNEL SYNDROME: ICD-10-CM

## 2019-04-30 LAB
BASOPHILS # BLD AUTO: 0 10E9/L (ref 0–0.2)
BASOPHILS NFR BLD AUTO: 0.3 %
DIFFERENTIAL METHOD BLD: NORMAL
EOSINOPHIL # BLD AUTO: 0.2 10E9/L (ref 0–0.7)
EOSINOPHIL NFR BLD AUTO: 2.3 %
ERYTHROCYTE [DISTWIDTH] IN BLOOD BY AUTOMATED COUNT: 13 % (ref 10–15)
HCT VFR BLD AUTO: 40 % (ref 40–53)
HGB BLD-MCNC: 13.8 G/DL (ref 13.3–17.7)
LYMPHOCYTES # BLD AUTO: 1.9 10E9/L (ref 0.8–5.3)
LYMPHOCYTES NFR BLD AUTO: 24.4 %
MCH RBC QN AUTO: 30 PG (ref 26.5–33)
MCHC RBC AUTO-ENTMCNC: 34.5 G/DL (ref 31.5–36.5)
MCV RBC AUTO: 87 FL (ref 78–100)
MONOCYTES # BLD AUTO: 0.8 10E9/L (ref 0–1.3)
MONOCYTES NFR BLD AUTO: 9.8 %
NEUTROPHILS # BLD AUTO: 4.9 10E9/L (ref 1.6–8.3)
NEUTROPHILS NFR BLD AUTO: 63.2 %
PLATELET # BLD AUTO: 302 10E9/L (ref 150–450)
PLATELET # BLD EST: NORMAL 10*3/UL
RBC # BLD AUTO: 4.6 10E12/L (ref 4.4–5.9)
RBC MORPH BLD: NORMAL
WBC # BLD AUTO: 7.8 10E9/L (ref 4–11)

## 2019-04-30 PROCEDURE — 80048 BASIC METABOLIC PNL TOTAL CA: CPT | Mod: ZL | Performed by: FAMILY MEDICINE

## 2019-04-30 PROCEDURE — G0463 HOSPITAL OUTPT CLINIC VISIT: HCPCS

## 2019-04-30 PROCEDURE — 93005 ELECTROCARDIOGRAM TRACING: CPT

## 2019-04-30 PROCEDURE — 99214 OFFICE O/P EST MOD 30 MIN: CPT | Mod: 25 | Performed by: FAMILY MEDICINE

## 2019-04-30 PROCEDURE — 93010 ELECTROCARDIOGRAM REPORT: CPT | Performed by: INTERNAL MEDICINE

## 2019-04-30 PROCEDURE — 85025 COMPLETE CBC W/AUTO DIFF WBC: CPT | Mod: ZL | Performed by: FAMILY MEDICINE

## 2019-04-30 PROCEDURE — 36415 COLL VENOUS BLD VENIPUNCTURE: CPT | Mod: ZL | Performed by: FAMILY MEDICINE

## 2019-04-30 ASSESSMENT — PAIN SCALES - GENERAL: PAINLEVEL: NO PAIN (0)

## 2019-04-30 NOTE — NURSING NOTE
"Chief Complaint   Patient presents with     Pre-Op Exam       Initial /62   Pulse 65   Wt 59.9 kg (132 lb)   SpO2 97%   BMI 21.97 kg/m   Estimated body mass index is 21.97 kg/m  as calculated from the following:    Height as of 10/9/18: 1.651 m (5' 5\").    Weight as of this encounter: 59.9 kg (132 lb).  Medication Reconciliation: complete    Julia Cardoza LPN  "

## 2019-05-01 LAB
ANION GAP SERPL CALCULATED.3IONS-SCNC: 7 MMOL/L (ref 3–14)
BUN SERPL-MCNC: 27 MG/DL (ref 7–30)
CALCIUM SERPL-MCNC: 8.4 MG/DL (ref 8.5–10.1)
CHLORIDE SERPL-SCNC: 105 MMOL/L (ref 94–109)
CO2 SERPL-SCNC: 26 MMOL/L (ref 20–32)
CREAT SERPL-MCNC: 1.03 MG/DL (ref 0.66–1.25)
GFR SERPL CREATININE-BSD FRML MDRD: 75 ML/MIN/{1.73_M2}
GLUCOSE SERPL-MCNC: 117 MG/DL (ref 70–99)
POTASSIUM SERPL-SCNC: 4.1 MMOL/L (ref 3.4–5.3)
SODIUM SERPL-SCNC: 138 MMOL/L (ref 133–144)

## 2019-05-02 NOTE — PROGRESS NOTES
SUBJECTIVE:   Augie Hancock is a 68 year old male who presents to clinic today for the following   health issues: f/u left lower lobe pneumonia dx 1 week ago. Feels improved.      pneumonia      Duration: 1 week f/u     Description (location/character/radiation): pneumonia f/u     Intensity:  mild    Accompanying signs and symptoms: feeling much better. Cough feels like it is productive.    History (similar episodes/previous evaluation): pt needs to be cleared for surgery on 5/13/19    Precipitating or alleviating factors: None    Therapies tried and outcome: rocephin injection, antibiotics           Additional history: as documented    Reviewed  and updated as needed this visit by clinical staff         Reviewed and updated as needed this visit by Provider         Patient Active Problem List   Diagnosis     ACP (advance care planning)     Prostate nodule     Advanced directives, counseling/discussion     Osteoarthrosis involving lower leg     Past Surgical History:   Procedure Laterality Date     COLONOSCOPY  12/17/2012    Repeat in 10 years     COLONOSCOPY  2004     JOINT REPLACEMENT  4/2011     leg fracture repair secondary to trauma      bilateral     RELEASE CARPAL TUNNEL Right 4/7/2016    Procedure: RELEASE CARPAL TUNNEL;  Surgeon: Moise Izaguirre MD;  Location: [facility]       Social History     Tobacco Use     Smoking status: Never Smoker     Smokeless tobacco: Current User     Types: Chew     Tobacco comment: no passive exposure   Substance Use Topics     Alcohol use: No     Alcohol/week: 0.0 oz     Family History   Problem Relation Age of Onset     Cerebrovascular Disease Father      Heart Disease Father 70        MI; cause of death     Coronary Artery Disease Other      Diabetes Brother         type 2     Diabetes Mother         type 2     Diabetes Sister         type 2     Coronary Artery Disease Sister 64        MI         Current Outpatient Medications   Medication Sig Dispense Refill     ASPIRIN  PO Take 81 mg by mouth daily       GLUCOSAMINE-CHONDROIT-MSM-C-MN PO Take 2 tablets by mouth daily glucosamin-chond-msm-adali-115HC  Take one by mouth daily       albuterol (PROAIR HFA/PROVENTIL HFA/VENTOLIN HFA) 108 (90 Base) MCG/ACT inhaler Inhale 2 puffs into the lungs every 6 hours as needed for shortness of breath / dyspnea or wheezing (Patient not taking: Reported on 5/7/2019) 18 g 0     benzonatate (TESSALON) 100 MG capsule Take 1 capsule (100 mg) by mouth 3 times daily as needed for cough (Patient not taking: Reported on 5/7/2019) 30 capsule 0     No Known Allergies    ROS:  Constitutional, HEENT, cardiovascular, pulmonary, gi and gu systems are negative, except as otherwise noted.    OBJECTIVE:                                                    /66   Pulse 64   Temp 97.6  F (36.4  C) (Tympanic)   Wt 59.4 kg (131 lb)   SpO2 98%   BMI 21.80 kg/m    Body mass index is 21.8 kg/m .          Physical Exam:  Constitutional: healthy, alert and no acute distress  Skin: No suspicious rash or skin lesion  ENT: Posterior pharynx moist and pink without edema or exudate.  EAC's clear. Right TM intact. Left TM intact  CV: RRR. No murmur  Pulm: Lungs clear to auscultation without wheeze, rales or rhonchi  Psych: mentation and affect appear normal                   ASSESSMENT/PLAN:                                                    (J15.9) Bacterial pneumonia  (primary encounter diagnosis)  Comment: He was scheduled for shoulder surgery next week with Dr. Becerra. He will call to reschedule. He will f/u here on 5-20-19 for recheck and f/u CXR and , hopefully, new clearance for his surgery.              EJ Helms  [facility]

## 2019-05-07 ENCOUNTER — OFFICE VISIT (OUTPATIENT)
Dept: FAMILY MEDICINE | Facility: OTHER | Age: 69
End: 2019-05-07
Attending: FAMILY MEDICINE
Payer: COMMERCIAL

## 2019-05-07 VITALS
HEART RATE: 64 BPM | WEIGHT: 131 LBS | TEMPERATURE: 97.6 F | SYSTOLIC BLOOD PRESSURE: 118 MMHG | DIASTOLIC BLOOD PRESSURE: 66 MMHG | OXYGEN SATURATION: 98 % | BODY MASS INDEX: 21.8 KG/M2

## 2019-05-07 DIAGNOSIS — J15.9 BACTERIAL PNEUMONIA: Primary | ICD-10-CM

## 2019-05-07 PROCEDURE — 99213 OFFICE O/P EST LOW 20 MIN: CPT | Performed by: PHYSICIAN ASSISTANT

## 2019-05-07 PROCEDURE — G0463 HOSPITAL OUTPT CLINIC VISIT: HCPCS

## 2019-05-07 ASSESSMENT — PAIN SCALES - GENERAL: PAINLEVEL: NO PAIN (0)

## 2019-05-07 NOTE — NURSING NOTE
"Chief Complaint   Patient presents with     ER F/U       Initial /66   Pulse 64   Temp 97.6  F (36.4  C) (Tympanic)   Wt 59.4 kg (131 lb)   SpO2 98%   BMI 21.80 kg/m   Estimated body mass index is 21.8 kg/m  as calculated from the following:    Height as of 10/9/18: 1.651 m (5' 5\").    Weight as of this encounter: 59.4 kg (131 lb).  Medication Reconciliation: complete    Shi Blankenship MA    "

## 2019-05-17 NOTE — PROGRESS NOTES
SUBJECTIVE:   Augie Hancock is a 68 year old male who presents to clinic today for the following   health issues:      Pneumonia follow up      Duration: 1 week f/u     Description (location/character/radiation): pneumonia    Intensity:  mild    Accompanying signs and symptoms: feels better. Still has a mild running nose but short of breath is gone.    History (similar episodes/previous evaluation): None    Precipitating or alleviating factors: None    Therapies tried and outcome: Rocephin       PROBLEMS TO ADD ON...    Additional history: cough improved.  Feels well again.  No new issues.      Reviewed  and updated as needed this visit by clinical staff         Reviewed and updated as needed this visit by Provider         Patient Active Problem List   Diagnosis     ACP (advance care planning)     Prostate nodule     Advanced directives, counseling/discussion     Osteoarthrosis involving lower leg     Past Surgical History:   Procedure Laterality Date     COLONOSCOPY  12/17/2012    Repeat in 10 years     COLONOSCOPY  2004     JOINT REPLACEMENT  4/2011     leg fracture repair secondary to trauma      bilateral     RELEASE CARPAL TUNNEL Right 4/7/2016    Procedure: RELEASE CARPAL TUNNEL;  Surgeon: Moise Izaguirre MD;  Location: [facility]       Social History     Tobacco Use     Smoking status: Never Smoker     Smokeless tobacco: Current User     Types: Chew     Tobacco comment: no passive exposure   Substance Use Topics     Alcohol use: No     Alcohol/week: 0.0 oz     Family History   Problem Relation Age of Onset     Cerebrovascular Disease Father      Heart Disease Father 70        MI; cause of death     Coronary Artery Disease Other      Diabetes Brother         type 2     Diabetes Mother         type 2     Diabetes Sister         type 2     Coronary Artery Disease Sister 64        MI         Current Outpatient Medications   Medication Sig Dispense Refill     ASPIRIN PO Take 81 mg by mouth daily        GLUCOSAMINE-CHONDROIT-MSM-C-MN PO Take 2 tablets by mouth daily glucosamin-chond-msm-adali-115HC  Take one by mouth daily       albuterol (PROAIR HFA/PROVENTIL HFA/VENTOLIN HFA) 108 (90 Base) MCG/ACT inhaler Inhale 2 puffs into the lungs every 6 hours as needed for shortness of breath / dyspnea or wheezing (Patient not taking: Reported on 5/7/2019) 18 g 0     benzonatate (TESSALON) 100 MG capsule Take 1 capsule (100 mg) by mouth 3 times daily as needed for cough (Patient not taking: Reported on 5/7/2019) 30 capsule 0     No Known Allergies    ROS:  Constitutional, HEENT, cardiovascular, pulmonary, gi and gu systems are negative, except as otherwise noted.    OBJECTIVE:                                                    /58   Pulse 69   Temp 97.2  F (36.2  C) (Tympanic)   Wt 59.4 kg (131 lb)   SpO2 99%   BMI 21.80 kg/m    Body mass index is 21.8 kg/m .  GENERAL APPEARANCE: Alert, no acute distress  CV: regular rate and rhythm, no murmur, rub or gallop  RESP: lungs clear to auscultation bilaterally  ABDOMEN: normal bowel sounds, soft, nontender, no hepatosplenomegaly or other masses  SKIN: no suspicious lesions or rashes to visualized skin  NEURO: Alert, oriented x 3, speech and mentation normal      cxr looks cleared mostly.  Visible on the lateral on the previous.  Cbc normal.      ASSESSMENT/PLAN:                                                    1. Pneumonia of left lower lobe due to infectious organism (H)  Resolved.  Clear for surgery on 5/30.  He has been improved in his sx for almost 2 weeks already.  F/u routine otherwise.  This note will be faxed for the surgery.    - XR CHEST 2 VW (Clinic Performed); Future  - CBC with platelets and differential          Jose Guadalupe Cotton MD  [facility]

## 2019-05-20 ENCOUNTER — OFFICE VISIT (OUTPATIENT)
Dept: FAMILY MEDICINE | Facility: OTHER | Age: 69
End: 2019-05-20
Attending: FAMILY MEDICINE
Payer: COMMERCIAL

## 2019-05-20 ENCOUNTER — TELEPHONE (OUTPATIENT)
Dept: FAMILY MEDICINE | Facility: OTHER | Age: 69
End: 2019-05-20

## 2019-05-20 ENCOUNTER — ANCILLARY PROCEDURE (OUTPATIENT)
Dept: GENERAL RADIOLOGY | Facility: OTHER | Age: 69
End: 2019-05-20
Attending: FAMILY MEDICINE
Payer: COMMERCIAL

## 2019-05-20 VITALS
HEART RATE: 69 BPM | DIASTOLIC BLOOD PRESSURE: 58 MMHG | WEIGHT: 131 LBS | SYSTOLIC BLOOD PRESSURE: 108 MMHG | BODY MASS INDEX: 21.8 KG/M2 | TEMPERATURE: 97.2 F | OXYGEN SATURATION: 99 %

## 2019-05-20 DIAGNOSIS — J18.9 PNEUMONIA OF LEFT LOWER LOBE DUE TO INFECTIOUS ORGANISM: ICD-10-CM

## 2019-05-20 DIAGNOSIS — J18.9 PNEUMONIA OF LEFT LOWER LOBE DUE TO INFECTIOUS ORGANISM: Primary | ICD-10-CM

## 2019-05-20 LAB
BASOPHILS # BLD AUTO: 0 10E9/L (ref 0–0.2)
BASOPHILS NFR BLD AUTO: 0.2 %
DIFFERENTIAL METHOD BLD: NORMAL
EOSINOPHIL # BLD AUTO: 0.3 10E9/L (ref 0–0.7)
EOSINOPHIL NFR BLD AUTO: 6.1 %
ERYTHROCYTE [DISTWIDTH] IN BLOOD BY AUTOMATED COUNT: 13.3 % (ref 10–15)
HCT VFR BLD AUTO: 42.9 % (ref 40–53)
HGB BLD-MCNC: 14.7 G/DL (ref 13.3–17.7)
LYMPHOCYTES # BLD AUTO: 0.9 10E9/L (ref 0.8–5.3)
LYMPHOCYTES NFR BLD AUTO: 18.4 %
MCH RBC QN AUTO: 30.8 PG (ref 26.5–33)
MCHC RBC AUTO-ENTMCNC: 34.3 G/DL (ref 31.5–36.5)
MCV RBC AUTO: 90 FL (ref 78–100)
MONOCYTES # BLD AUTO: 0.5 10E9/L (ref 0–1.3)
MONOCYTES NFR BLD AUTO: 9.7 %
NEUTROPHILS # BLD AUTO: 3.1 10E9/L (ref 1.6–8.3)
NEUTROPHILS NFR BLD AUTO: 65.6 %
PLATELET # BLD AUTO: 190 10E9/L (ref 150–450)
RBC # BLD AUTO: 4.77 10E12/L (ref 4.4–5.9)
WBC # BLD AUTO: 4.7 10E9/L (ref 4–11)

## 2019-05-20 PROCEDURE — 71046 X-RAY EXAM CHEST 2 VIEWS: CPT | Mod: TC,FY

## 2019-05-20 PROCEDURE — 85025 COMPLETE CBC W/AUTO DIFF WBC: CPT | Mod: ZL | Performed by: FAMILY MEDICINE

## 2019-05-20 PROCEDURE — 36415 COLL VENOUS BLD VENIPUNCTURE: CPT | Mod: ZL | Performed by: FAMILY MEDICINE

## 2019-05-20 PROCEDURE — G0463 HOSPITAL OUTPT CLINIC VISIT: HCPCS | Mod: 25

## 2019-05-20 PROCEDURE — 99213 OFFICE O/P EST LOW 20 MIN: CPT | Performed by: FAMILY MEDICINE

## 2019-05-20 ASSESSMENT — PAIN SCALES - GENERAL: PAINLEVEL: NO PAIN (0)

## 2019-05-20 NOTE — NURSING NOTE
"Chief Complaint   Patient presents with     RECHECK     follow up pneumonia       Initial /58   Pulse 69   Temp 97.2  F (36.2  C) (Tympanic)   Wt 59.4 kg (131 lb)   SpO2 99%   BMI 21.80 kg/m   Estimated body mass index is 21.8 kg/m  as calculated from the following:    Height as of 10/9/18: 1.651 m (5' 5\").    Weight as of this encounter: 59.4 kg (131 lb).  Medication Reconciliation: complete    Shi Blankenship MA    "

## 2019-05-20 NOTE — TELEPHONE ENCOUNTER
Faxed preop info fx# [phone]  [facility] fxd demo,med list, note 5/20/19 Dr Cotton,labs,chest xray report 5/20/19, EKG  Dx: procedure  Lesley Casey

## 2019-06-04 ENCOUNTER — TRANSFERRED RECORDS (OUTPATIENT)
Dept: HEALTH INFORMATION MANAGEMENT | Facility: CLINIC | Age: 69
End: 2019-06-04

## 2019-06-12 ENCOUNTER — TRANSFERRED RECORDS (OUTPATIENT)
Dept: HEALTH INFORMATION MANAGEMENT | Facility: CLINIC | Age: 69
End: 2019-06-12

## 2019-07-03 ENCOUNTER — TRANSFERRED RECORDS (OUTPATIENT)
Dept: HEALTH INFORMATION MANAGEMENT | Facility: CLINIC | Age: 69
End: 2019-07-03

## 2019-08-07 ENCOUNTER — TRANSFERRED RECORDS (OUTPATIENT)
Dept: HEALTH INFORMATION MANAGEMENT | Facility: CLINIC | Age: 69
End: 2019-08-07

## 2019-09-04 ENCOUNTER — TRANSFERRED RECORDS (OUTPATIENT)
Dept: HEALTH INFORMATION MANAGEMENT | Facility: CLINIC | Age: 69
End: 2019-09-04

## 2019-10-18 DIAGNOSIS — C61 PROSTATE CANCER (H): Primary | ICD-10-CM

## 2019-10-24 ENCOUNTER — IMMUNIZATION (OUTPATIENT)
Dept: FAMILY MEDICINE | Facility: OTHER | Age: 69
End: 2019-10-24
Attending: FAMILY MEDICINE
Payer: COMMERCIAL

## 2019-10-24 DIAGNOSIS — Z23 NEED FOR PROPHYLACTIC VACCINATION AND INOCULATION AGAINST INFLUENZA: Primary | ICD-10-CM

## 2019-10-24 PROCEDURE — 90662 IIV NO PRSV INCREASED AG IM: CPT

## 2019-10-24 PROCEDURE — G0008 ADMIN INFLUENZA VIRUS VAC: HCPCS

## 2019-12-04 DIAGNOSIS — C61 PROSTATE CANCER (H): ICD-10-CM

## 2019-12-04 PROCEDURE — 84153 ASSAY OF PSA TOTAL: CPT | Mod: ZL | Performed by: UROLOGY

## 2019-12-04 PROCEDURE — 36415 COLL VENOUS BLD VENIPUNCTURE: CPT | Mod: ZL | Performed by: UROLOGY

## 2019-12-05 LAB — PSA SERPL-MCNC: 2.24 UG/L (ref 0–4)

## 2020-01-30 ENCOUNTER — OFFICE VISIT (OUTPATIENT)
Dept: FAMILY MEDICINE | Facility: OTHER | Age: 70
End: 2020-01-30
Attending: FAMILY MEDICINE
Payer: COMMERCIAL

## 2020-01-30 VITALS
HEART RATE: 62 BPM | OXYGEN SATURATION: 97 % | DIASTOLIC BLOOD PRESSURE: 62 MMHG | SYSTOLIC BLOOD PRESSURE: 92 MMHG | WEIGHT: 135.6 LBS | HEIGHT: 65 IN | BODY MASS INDEX: 22.59 KG/M2 | TEMPERATURE: 98.3 F

## 2020-01-30 DIAGNOSIS — R41.0 INTERMITTENT CONFUSION: ICD-10-CM

## 2020-01-30 DIAGNOSIS — R68.89 OTHER GENERAL SYMPTOMS AND SIGNS: ICD-10-CM

## 2020-01-30 DIAGNOSIS — R55 PRE-SYNCOPE: ICD-10-CM

## 2020-01-30 DIAGNOSIS — Z00.00 ROUTINE GENERAL MEDICAL EXAMINATION AT A HEALTH CARE FACILITY: Primary | ICD-10-CM

## 2020-01-30 DIAGNOSIS — C61 PROSTATE CANCER (H): ICD-10-CM

## 2020-01-30 LAB
BASOPHILS # BLD AUTO: 0 10E9/L (ref 0–0.2)
BASOPHILS NFR BLD AUTO: 0.5 %
DIFFERENTIAL METHOD BLD: NORMAL
EOSINOPHIL # BLD AUTO: 0.2 10E9/L (ref 0–0.7)
EOSINOPHIL NFR BLD AUTO: 3.9 %
ERYTHROCYTE [DISTWIDTH] IN BLOOD BY AUTOMATED COUNT: 13.2 % (ref 10–15)
HCT VFR BLD AUTO: 43.3 % (ref 40–53)
HGB BLD-MCNC: 15 G/DL (ref 13.3–17.7)
LYMPHOCYTES # BLD AUTO: 1.5 10E9/L (ref 0.8–5.3)
LYMPHOCYTES NFR BLD AUTO: 24.6 %
MCH RBC QN AUTO: 30.5 PG (ref 26.5–33)
MCHC RBC AUTO-ENTMCNC: 34.6 G/DL (ref 31.5–36.5)
MCV RBC AUTO: 88 FL (ref 78–100)
MONOCYTES # BLD AUTO: 0.7 10E9/L (ref 0–1.3)
MONOCYTES NFR BLD AUTO: 10.6 %
NEUTROPHILS # BLD AUTO: 3.7 10E9/L (ref 1.6–8.3)
NEUTROPHILS NFR BLD AUTO: 60.4 %
PLATELET # BLD AUTO: 240 10E9/L (ref 150–450)
RBC # BLD AUTO: 4.92 10E12/L (ref 4.4–5.9)
WBC # BLD AUTO: 6.2 10E9/L (ref 4–11)

## 2020-01-30 PROCEDURE — 80061 LIPID PANEL: CPT | Mod: ZL | Performed by: FAMILY MEDICINE

## 2020-01-30 PROCEDURE — 80053 COMPREHEN METABOLIC PANEL: CPT | Mod: ZL | Performed by: FAMILY MEDICINE

## 2020-01-30 PROCEDURE — 84443 ASSAY THYROID STIM HORMONE: CPT | Mod: ZL | Performed by: FAMILY MEDICINE

## 2020-01-30 PROCEDURE — 84439 ASSAY OF FREE THYROXINE: CPT | Mod: ZL | Performed by: FAMILY MEDICINE

## 2020-01-30 PROCEDURE — 99213 OFFICE O/P EST LOW 20 MIN: CPT | Mod: 25 | Performed by: FAMILY MEDICINE

## 2020-01-30 PROCEDURE — G0463 HOSPITAL OUTPT CLINIC VISIT: HCPCS

## 2020-01-30 PROCEDURE — G0438 PPPS, INITIAL VISIT: HCPCS | Performed by: FAMILY MEDICINE

## 2020-01-30 PROCEDURE — 85025 COMPLETE CBC W/AUTO DIFF WBC: CPT | Mod: ZL | Performed by: FAMILY MEDICINE

## 2020-01-30 PROCEDURE — 36415 COLL VENOUS BLD VENIPUNCTURE: CPT | Mod: ZL | Performed by: FAMILY MEDICINE

## 2020-01-30 ASSESSMENT — MIFFLIN-ST. JEOR: SCORE: 1316.96

## 2020-01-30 ASSESSMENT — PAIN SCALES - GENERAL: PAINLEVEL: NO PAIN (0)

## 2020-01-30 NOTE — PATIENT INSTRUCTIONS
Preventive Health Recommendations:     See your health care provider every year to    Review health changes.     Discuss preventive care.      Review your medicines if your doctor has prescribed any.      Talk with your health care provider about whether you should have a test to screen for prostate cancer (PSA).    Every 3 years, have a diabetes test (fasting glucose). If you are at risk for diabetes, you should have this test more often.    Every 5 years, have a cholesterol test. Have this test more often if you are at risk for high cholesterol or heart disease.     Every 10 years, have a colonoscopy. Or, have a yearly FIT test (stool test). These exams will check for colon cancer.    Talk to with your health care provider about screening for Abdominal Aortic Aneurysm if you have a family history of AAA or have a history of smoking.    Shots:     Get a flu shot each year.     Get a tetanus shot every 10 years.     Talk to your doctor about your pneumonia vaccines. There are now two you should receive - Pneumovax (PPSV 23) and Prevnar (PCV 13).     Talk to your pharmacist about a shingles vaccine.     Talk to your doctor about the hepatitis B vaccine.  Nutrition:     Eat at least 5 servings of fruits and vegetables each day.     Eat whole-grain bread, whole-wheat pasta and brown rice instead of white grains and rice.     Get adequate Calcium and Vitamin D.   Lifestyle    Exercise for at least 150 minutes a week (30 minutes a day, 5 days a week). This will help you control your weight and prevent disease.     Limit alcohol to one drink per day.     No smoking.     Wear sunscreen to prevent skin cancer.    See your dentist every six months for an exam and cleaning.    See your eye doctor every 1 to 2 years to screen for conditions such as glaucoma, macular degeneration, cataracts, etc.    Personalized Prevention Plan  You are due for the preventive services outlined below.  Your care team is available to assist you  in scheduling these services.  If you have already completed any of these items, please share that information with your care team to update in your medical record.  Health Maintenance Due   Topic Date Due     Pneumococcal Vaccine (1 of 2 - PCV13) 12/10/2017     Annual Wellness Visit  10/09/2019     Cholesterol Lab  10/09/2019     PHQ-2  01/01/2020

## 2020-01-30 NOTE — NURSING NOTE
"Chief Complaint   Patient presents with     Physical     Cough       Initial BP 92/62 (BP Location: Right arm, Patient Position: Chair, Cuff Size: Adult Regular)   Pulse 62   Temp 98.3  F (36.8  C) (Tympanic)   Ht 1.651 m (5' 5\")   Wt 61.5 kg (135 lb 9.6 oz)   SpO2 97%   BMI 22.57 kg/m   Estimated body mass index is 22.57 kg/m  as calculated from the following:    Height as of this encounter: 1.651 m (5' 5\").    Weight as of this encounter: 61.5 kg (135 lb 9.6 oz).  Medication Reconciliation: complete  Betina Barker LPN  "

## 2020-01-31 LAB
ALBUMIN SERPL-MCNC: 3.9 G/DL (ref 3.4–5)
ALP SERPL-CCNC: 52 U/L (ref 40–150)
ALT SERPL W P-5'-P-CCNC: 21 U/L (ref 0–70)
ANION GAP SERPL CALCULATED.3IONS-SCNC: 4 MMOL/L (ref 3–14)
AST SERPL W P-5'-P-CCNC: 10 U/L (ref 0–45)
BILIRUB SERPL-MCNC: 0.4 MG/DL (ref 0.2–1.3)
BUN SERPL-MCNC: 28 MG/DL (ref 7–30)
CALCIUM SERPL-MCNC: 9 MG/DL (ref 8.5–10.1)
CHLORIDE SERPL-SCNC: 105 MMOL/L (ref 94–109)
CHOLEST SERPL-MCNC: 193 MG/DL
CO2 SERPL-SCNC: 29 MMOL/L (ref 20–32)
CREAT SERPL-MCNC: 0.96 MG/DL (ref 0.66–1.25)
GFR SERPL CREATININE-BSD FRML MDRD: 81 ML/MIN/{1.73_M2}
GLUCOSE SERPL-MCNC: 87 MG/DL (ref 70–99)
HDLC SERPL-MCNC: 63 MG/DL
LDLC SERPL CALC-MCNC: 112 MG/DL
NONHDLC SERPL-MCNC: 130 MG/DL
POTASSIUM SERPL-SCNC: 4.6 MMOL/L (ref 3.4–5.3)
PROT SERPL-MCNC: 7.1 G/DL (ref 6.8–8.8)
SODIUM SERPL-SCNC: 138 MMOL/L (ref 133–144)
T4 FREE SERPL-MCNC: 0.92 NG/DL (ref 0.76–1.46)
TRIGL SERPL-MCNC: 90 MG/DL
TSH SERPL DL<=0.005 MIU/L-ACNC: 4.03 MU/L (ref 0.4–4)

## 2020-02-07 ENCOUNTER — HOSPITAL ENCOUNTER (OUTPATIENT)
Dept: MRI IMAGING | Facility: HOSPITAL | Age: 70
Discharge: HOME | End: 2020-02-07
Attending: FAMILY MEDICINE | Admitting: FAMILY MEDICINE
Payer: COMMERCIAL

## 2020-02-07 DIAGNOSIS — R55 PRE-SYNCOPE: ICD-10-CM

## 2020-02-07 DIAGNOSIS — C61 PROSTATE CANCER (H): ICD-10-CM

## 2020-02-07 PROCEDURE — 25500064 ZZH RX 255 OP 636: Performed by: RADIOLOGY

## 2020-02-07 PROCEDURE — A9585 GADOBUTROL INJECTION: HCPCS | Performed by: RADIOLOGY

## 2020-02-07 PROCEDURE — 70553 MRI BRAIN STEM W/O & W/DYE: CPT | Mod: TC

## 2020-02-07 RX ORDER — GADOBUTROL 604.72 MG/ML
2 INJECTION INTRAVENOUS ONCE
Status: COMPLETED | OUTPATIENT
Start: 2020-02-07 | End: 2020-02-07

## 2020-02-07 RX ADMIN — GADOBUTROL 2 ML: 604.72 INJECTION INTRAVENOUS at 07:41

## 2020-05-12 ENCOUNTER — VIRTUAL VISIT (OUTPATIENT)
Dept: FAMILY MEDICINE | Facility: OTHER | Age: 70
End: 2020-05-12
Attending: FAMILY MEDICINE
Payer: COMMERCIAL

## 2020-05-12 VITALS — BODY MASS INDEX: 22.47 KG/M2 | WEIGHT: 135 LBS

## 2020-05-12 DIAGNOSIS — R55 PRE-SYNCOPE: Primary | ICD-10-CM

## 2020-05-12 PROCEDURE — 99213 OFFICE O/P EST LOW 20 MIN: CPT | Mod: TEL | Performed by: FAMILY MEDICINE

## 2020-05-12 RX ORDER — CHOLECALCIFEROL (VITAMIN D3) 50 MCG
1 TABLET ORAL DAILY
COMMUNITY

## 2020-05-12 ASSESSMENT — PAIN SCALES - GENERAL: PAINLEVEL: NO PAIN (0)

## 2020-05-12 NOTE — NURSING NOTE
"Chief Complaint   Patient presents with     Syncope       Initial Wt 61.2 kg (135 lb)   BMI 22.47 kg/m   Estimated body mass index is 22.47 kg/m  as calculated from the following:    Height as of 1/30/20: 1.651 m (5' 5\").    Weight as of this encounter: 61.2 kg (135 lb).  Medication Reconciliation: complete  Nadiya Hutchison LPN    "

## 2020-05-12 NOTE — PROGRESS NOTES
"Augie Hancock is a 69 year old male who is being evaluated via a billable telephone visit.      The patient has been notified of following:     \"This telephone visit will be conducted via a call between you and your physician/provider. We have found that certain health care needs can be provided without the need for a physical exam.  This service lets us provide the care you need with a short phone conversation.  If a prescription is necessary we can send it directly to your pharmacy.  If lab work is needed we can place an order for that and you can then stop by our lab to have the test done at a later time.    Telephone visits are billed at different rates depending on your insurance coverage. During this emergency period, for some insurers they may be billed the same as an in-person visit.  Please reach out to your insurance provider with any questions.    If during the course of the call the physician/provider feels a telephone visit is not appropriate, you will not be charged for this service.\"    Patient has given verbal consent for Telephone visit?  Yes    What phone number would you like to be contacted at? [phone]    How would you like to obtain your AVS? MyChart    Subjective     Augie Hancock is a 69 year old male who presents to clinic today for the following health issues:    HPI  Presyncope      Duration: F/u     Description (location/character/radiation): stated in February he described talking but no one hearing him.     Accompanying signs and symptoms: none    History (similar episodes/previous evaluation): Yes MRI, stress test and EKG    Precipitating or alleviating factors: None    Therapies tried and outcome: None          PROBLEMS TO ADD ON...    Patient Active Problem List   Diagnosis     ACP (advance care planning)     Prostate nodule     Advanced directives, counseling/discussion     Osteoarthrosis involving lower leg     Prostate cancer (H)     Past Surgical History:   Procedure " Laterality Date     COLONOSCOPY  12/17/2012    Repeat in 10 years     COLONOSCOPY  2004     JOINT REPLACEMENT  4/2011     leg fracture repair secondary to trauma      bilateral     RELEASE CARPAL TUNNEL Right 4/7/2016    Procedure: RELEASE CARPAL TUNNEL;  Surgeon: Moise Izaguirre MD;  Location: [facility]       Social History     Tobacco Use     Smoking status: Never Smoker     Smokeless tobacco: Current User     Types: Chew     Tobacco comment: no passive exposure   Substance Use Topics     Alcohol use: No     Alcohol/week: 0.0 standard drinks     Family History   Problem Relation Age of Onset     Cerebrovascular Disease Father      Heart Disease Father 70        MI; cause of death     Coronary Artery Disease Other      Diabetes Brother         type 2     Diabetes Mother         type 2     Diabetes Sister         type 2     Coronary Artery Disease Sister 64        MI         Current Outpatient Medications   Medication Sig Dispense Refill     ASPIRIN PO Take 81 mg by mouth daily       GLUCOSAMINE-CHONDROIT-MSM-C-MN PO Take 2 tablets by mouth daily glucosamin-chond-msm-adali-115HC  Take one by mouth daily       vitamin D3 (CHOLECALCIFEROL) 2000 units (50 mcg) tablet Take 1 tablet by mouth daily       No Known Allergies    Reviewed and updated as needed this visit by Provider         Review of Systems   Constitutional, HEENT, cardiovascular, pulmonary, gi and gu systems are negative, except as otherwise noted.       Objective   Reported vitals:  Wt 61.2 kg (135 lb)   BMI 22.47 kg/m     healthy, alert and no distress  PSYCH: Alert and oriented times 3; coherent speech, normal   rate and volume, able to articulate logical thoughts, able   to abstract reason, no tangential thoughts, no hallucinations   or delusions  His affect is normal  RESP: No cough, no audible wheezing, able to talk in full sentences  Remainder of exam unable to be completed due to telephone visits    Diagnostic Test Results:  Labs reviewed in Epic         Assessment/Plan:  1. Pre-syncope  Improved.  Lengthy review of this and the implications.  He is going to push fluids.  It was an isolated occurrence with normal MRI brain and EKG without arrhythmia.  Discussed with him.  17 minutes spent in discussion.  No change in cares.  He is going to monitor and report post covid.          No follow-ups on file.      Phone call duration:  17 minutes    Jose Guadalupe Cotton MD

## 2020-07-23 DIAGNOSIS — C61 MALIGNANT NEOPLASM OF PROSTATE (H): Primary | ICD-10-CM

## 2020-07-27 DIAGNOSIS — C61 MALIGNANT NEOPLASM OF PROSTATE (H): ICD-10-CM

## 2020-07-27 LAB — PSA SERPL-MCNC: 2.71 UG/L (ref 0–4)

## 2020-07-27 PROCEDURE — 84153 ASSAY OF PSA TOTAL: CPT | Mod: ZL | Performed by: UROLOGY

## 2020-07-27 PROCEDURE — 36415 COLL VENOUS BLD VENIPUNCTURE: CPT | Mod: ZL | Performed by: UROLOGY

## 2020-08-03 ENCOUNTER — TRANSFERRED RECORDS (OUTPATIENT)
Dept: HEALTH INFORMATION MANAGEMENT | Facility: CLINIC | Age: 70
End: 2020-08-03

## 2020-12-20 ENCOUNTER — HEALTH MAINTENANCE LETTER (OUTPATIENT)
Age: 70
End: 2020-12-20

## 2021-01-22 ENCOUNTER — TELEPHONE (OUTPATIENT)
Dept: FAMILY MEDICINE | Facility: OTHER | Age: 71
End: 2021-01-22

## 2021-01-22 DIAGNOSIS — Z01.89 PATIENT REQUESTED DIAGNOSTIC TESTING: Primary | ICD-10-CM

## 2021-01-22 DIAGNOSIS — Z13.9 SCREENING FOR CONDITION: ICD-10-CM

## 2021-01-22 NOTE — TELEPHONE ENCOUNTER
9:03 AM    Reason for Call: Phone Call    Description: Augie has questions on if he should get an antibodies covid lab draw done?   On Tuesday January 26, 2021 at 8:15am [place] he will be coming in for his PSA ordered by Dr Addy Alvarez and he is wondering if it worth his time for the antibodies COVID lab draw?     Was an appointment offered for this call? No  If yes : Appointment type              Date    Preferred method for responding to this message: Telephone Call  What is your phone number ? [phone]    If we cannot reach you directly, may we leave a detailed response at the number you provided? Yes    Can this message wait until your PCP/provider returns, if available today? YES, No    Aimee Cummings

## 2021-01-25 ENCOUNTER — TELEPHONE (OUTPATIENT)
Dept: FAMILY MEDICINE | Facility: OTHER | Age: 71
End: 2021-01-25

## 2021-01-25 DIAGNOSIS — Z01.84 ANTIBODY RESPONSE EXAMINATION: Primary | ICD-10-CM

## 2021-01-25 NOTE — TELEPHONE ENCOUNTER
Patient called asking if Dr. Cotton will place Antibodies tests.  Patient as an appointment tomorrow in [facility]    Please call to let him know they have been placed  [phone]

## 2021-01-25 NOTE — TELEPHONE ENCOUNTER
I signed it, but please let Pilar know an antibody test request can go to triage directly who has a protocol for this.  Thanks.  Jose Guadalupe Cotton MD

## 2021-01-26 DIAGNOSIS — C61 MALIGNANT NEOPLASM OF PROSTATE (H): Primary | ICD-10-CM

## 2021-01-26 DIAGNOSIS — Z01.84 ANTIBODY RESPONSE EXAMINATION: ICD-10-CM

## 2021-01-26 LAB — PSA SERPL-MCNC: 2.39 UG/L (ref 0–4)

## 2021-01-26 PROCEDURE — 36415 COLL VENOUS BLD VENIPUNCTURE: CPT | Mod: ZL | Performed by: FAMILY MEDICINE

## 2021-01-26 PROCEDURE — 86769 SARS-COV-2 COVID-19 ANTIBODY: CPT | Mod: ZL | Performed by: FAMILY MEDICINE

## 2021-01-26 PROCEDURE — 84153 ASSAY OF PSA TOTAL: CPT | Mod: ZL | Performed by: FAMILY MEDICINE

## 2021-01-28 LAB
SARS-COV-2 AB PNL SERPL IA: NEGATIVE
SARS-COV-2 IGG SERPL IA-ACNC: NORMAL

## 2021-04-18 ENCOUNTER — HEALTH MAINTENANCE LETTER (OUTPATIENT)
Age: 71
End: 2021-04-18

## 2021-05-18 ASSESSMENT — PATIENT HEALTH QUESTIONNAIRE - PHQ9
SUM OF ALL RESPONSES TO PHQ QUESTIONS 1-9: 0
SUM OF ALL RESPONSES TO PHQ QUESTIONS 1-9: 0
10. IF YOU CHECKED OFF ANY PROBLEMS, HOW DIFFICULT HAVE THESE PROBLEMS MADE IT FOR YOU TO DO YOUR WORK, TAKE CARE OF THINGS AT HOME, OR GET ALONG WITH OTHER PEOPLE: NOT DIFFICULT AT ALL

## 2021-05-18 ASSESSMENT — ANXIETY QUESTIONNAIRES
GAD7 TOTAL SCORE: 0
5. BEING SO RESTLESS THAT IT IS HARD TO SIT STILL: NOT AT ALL
2. NOT BEING ABLE TO STOP OR CONTROL WORRYING: NOT AT ALL
GAD7 TOTAL SCORE: 0
4. TROUBLE RELAXING: NOT AT ALL
7. FEELING AFRAID AS IF SOMETHING AWFUL MIGHT HAPPEN: NOT AT ALL
GAD7 TOTAL SCORE: 0
3. WORRYING TOO MUCH ABOUT DIFFERENT THINGS: NOT AT ALL
1. FEELING NERVOUS, ANXIOUS, OR ON EDGE: NOT AT ALL
7. FEELING AFRAID AS IF SOMETHING AWFUL MIGHT HAPPEN: NOT AT ALL
6. BECOMING EASILY ANNOYED OR IRRITABLE: NOT AT ALL

## 2021-05-18 ASSESSMENT — ACTIVITIES OF DAILY LIVING (ADL): CURRENT_FUNCTION: NO ASSISTANCE NEEDED

## 2021-05-19 ASSESSMENT — ANXIETY QUESTIONNAIRES: GAD7 TOTAL SCORE: 0

## 2021-05-20 ENCOUNTER — OFFICE VISIT (OUTPATIENT)
Dept: FAMILY MEDICINE | Facility: OTHER | Age: 71
End: 2021-05-20
Attending: FAMILY MEDICINE
Payer: COMMERCIAL

## 2021-05-20 VITALS
HEART RATE: 61 BPM | WEIGHT: 131 LBS | BODY MASS INDEX: 22.36 KG/M2 | DIASTOLIC BLOOD PRESSURE: 72 MMHG | HEIGHT: 64 IN | TEMPERATURE: 96.8 F | OXYGEN SATURATION: 98 % | SYSTOLIC BLOOD PRESSURE: 120 MMHG

## 2021-05-20 DIAGNOSIS — C61 PROSTATE CANCER (H): ICD-10-CM

## 2021-05-20 DIAGNOSIS — Z13.220 LIPID SCREENING: ICD-10-CM

## 2021-05-20 DIAGNOSIS — Z00.00 ENCOUNTER FOR MEDICARE ANNUAL WELLNESS EXAM: Primary | ICD-10-CM

## 2021-05-20 LAB
ALBUMIN SERPL-MCNC: 3.8 G/DL (ref 3.4–5)
ALP SERPL-CCNC: 56 U/L (ref 40–150)
ALT SERPL W P-5'-P-CCNC: 18 U/L (ref 0–70)
ANION GAP SERPL CALCULATED.3IONS-SCNC: 7 MMOL/L (ref 3–14)
AST SERPL W P-5'-P-CCNC: 19 U/L (ref 0–45)
BILIRUB SERPL-MCNC: 0.6 MG/DL (ref 0.2–1.3)
BUN SERPL-MCNC: 24 MG/DL (ref 7–30)
CALCIUM SERPL-MCNC: 8.8 MG/DL (ref 8.5–10.1)
CHLORIDE SERPL-SCNC: 104 MMOL/L (ref 94–109)
CHOLEST SERPL-MCNC: 183 MG/DL
CO2 SERPL-SCNC: 27 MMOL/L (ref 20–32)
CREAT SERPL-MCNC: 0.82 MG/DL (ref 0.66–1.25)
GFR SERPL CREATININE-BSD FRML MDRD: >90 ML/MIN/{1.73_M2}
GLUCOSE SERPL-MCNC: 90 MG/DL (ref 70–99)
HDLC SERPL-MCNC: 69 MG/DL
LDLC SERPL CALC-MCNC: 100 MG/DL
NONHDLC SERPL-MCNC: 114 MG/DL
POTASSIUM SERPL-SCNC: 4.1 MMOL/L (ref 3.4–5.3)
PROT SERPL-MCNC: 6.9 G/DL (ref 6.8–8.8)
SODIUM SERPL-SCNC: 138 MMOL/L (ref 133–144)
TRIGL SERPL-MCNC: 68 MG/DL

## 2021-05-20 PROCEDURE — 36415 COLL VENOUS BLD VENIPUNCTURE: CPT | Mod: ZL | Performed by: FAMILY MEDICINE

## 2021-05-20 PROCEDURE — G0463 HOSPITAL OUTPT CLINIC VISIT: HCPCS

## 2021-05-20 PROCEDURE — G0439 PPPS, SUBSEQ VISIT: HCPCS | Performed by: FAMILY MEDICINE

## 2021-05-20 PROCEDURE — 80061 LIPID PANEL: CPT | Mod: ZL | Performed by: FAMILY MEDICINE

## 2021-05-20 PROCEDURE — 80053 COMPREHEN METABOLIC PANEL: CPT | Mod: ZL | Performed by: FAMILY MEDICINE

## 2021-05-20 ASSESSMENT — PATIENT HEALTH QUESTIONNAIRE - PHQ9
5. POOR APPETITE OR OVEREATING: NOT AT ALL
SUM OF ALL RESPONSES TO PHQ QUESTIONS 1-9: 0

## 2021-05-20 ASSESSMENT — ANXIETY QUESTIONNAIRES
IF YOU CHECKED OFF ANY PROBLEMS ON THIS QUESTIONNAIRE, HOW DIFFICULT HAVE THESE PROBLEMS MADE IT FOR YOU TO DO YOUR WORK, TAKE CARE OF THINGS AT HOME, OR GET ALONG WITH OTHER PEOPLE: NOT DIFFICULT AT ALL
6. BECOMING EASILY ANNOYED OR IRRITABLE: NOT AT ALL
1. FEELING NERVOUS, ANXIOUS, OR ON EDGE: NOT AT ALL
GAD7 TOTAL SCORE: 0
2. NOT BEING ABLE TO STOP OR CONTROL WORRYING: NOT AT ALL
5. BEING SO RESTLESS THAT IT IS HARD TO SIT STILL: NOT AT ALL
3. WORRYING TOO MUCH ABOUT DIFFERENT THINGS: NOT AT ALL
7. FEELING AFRAID AS IF SOMETHING AWFUL MIGHT HAPPEN: NOT AT ALL

## 2021-05-20 ASSESSMENT — PAIN SCALES - GENERAL: PAINLEVEL: NO PAIN (0)

## 2021-05-20 ASSESSMENT — MIFFLIN-ST. JEOR: SCORE: 1275.21

## 2021-05-20 NOTE — PROGRESS NOTES
"  SUBJECTIVE:   Augie Hancock is a 70 year old male who presents for Preventive Visit.      Patient has been advised of split billing requirements and indicates understanding: Yes  Are you in the first 12 months of your Medicare Part B coverage?  No    Physical Health:    In general, how would you rate your overall physical health? good    Outside of work, how many days during the week do you exercise? 6-7 days/week    Outside of work, approximately how many minutes a day do you exercise?45-60 minutes    If you drink alcohol do you typically have >3 drinks per day or >7 drinks per week? No    Do you usually eat at least 4 servings of fruit and vegetables a day, include whole grains & fiber and avoid regularly eating high fat or \"junk\" foods? Yes    Do you have any problems taking medications regularly?  No    Do you have any side effects from medications? none    Needs assistance for the following daily activities: no assistance needed    Which of the following safety concerns are present in your home?  none identified     Hearing impairment: No    In the past 6 months, have you been bothered by leaking of urine? no    Mental Health:    In general, how would you rate your overall mental or emotional health? good  PHQ-2 Score: (P) 0    Do you feel safe in your environment? Yes    Have you ever done Advance Care Planning? (For example, a Health Directive, POLST, or a discussion with a medical provider or your loved ones about your wishes): Yes, patient states has an Advance Care Planning document and will bring a copy to the clinic.    Additional concerns to address?  No    Fall risk:  Fallen 2 or more times in the past year?: No  Any fall with injury in the past year?: No    Cognitive Screenin) Repeat 3 items (Leader, Season, Table)    2) Clock draw: NORMAL  3) 3 item recall: Recalls 3 objects  Results: 3 items recalled: COGNITIVE IMPAIRMENT LESS LIKELY    Mini-CogTM Copyright CATALINA Vaz. Licensed by the " author for use in [facility]; reprinted with permission (becky@[email or web address]). All rights reserved.      Do you have sleep apnea, excessive snoring or daytime drowsiness?: no            Reviewed and updated as needed this visit by clinical staff  Tobacco  Allergies  Meds              Reviewed and updated as needed this visit by Provider                Social History     Tobacco Use     Smoking status: Never Smoker     Smokeless tobacco: Current User     Types: Chew     Tobacco comment: no passive exposure   Substance Use Topics     Alcohol use: No     Alcohol/week: 0.0 standard drinks                           Current providers sharing in care for this patient include:   Patient Care Team:  Jose Guadalupe Cotton MD as PCP - General  Jose Guadalupe Cotton MD as Assigned PCP    The following health maintenance items are reviewed in Epic and correct as of today:  Health Maintenance   Topic Date Due     Pneumococcal Vaccine: 65+ Years (1 of 1 - PPSV23) Never done     LIPID  01/30/2021     FALL RISK ASSESSMENT  05/12/2021     MEDICARE ANNUAL WELLNESS VISIT  05/20/2022     COLORECTAL CANCER SCREENING  12/17/2022     DTAP/TDAP/TD IMMUNIZATION (3 - Td) 05/29/2025     ADVANCE CARE PLANNING  05/20/2026     HEPATITIS C SCREENING  Completed     PHQ-2  Completed     INFLUENZA VACCINE  Completed     ZOSTER IMMUNIZATION  Completed     AORTIC ANEURYSM SCREENING (SYSTEM ASSIGNED)  Completed     COVID-19 Vaccine  Completed     IPV IMMUNIZATION  Aged Out     MENINGITIS IMMUNIZATION  Aged Out     HEPATITIS B IMMUNIZATION  Aged Out     Lab work is in process      ROS:  CONSTITUTIONAL: NEGATIVE for fever, chills, change in weight  INTEGUMENTARY/SKIN: NEGATIVE for worrisome rashes, moles or lesions  EYES: NEGATIVE for vision changes or irritation  ENT/MOUTH: NEGATIVE for ear, mouth and throat problems  RESP: NEGATIVE for significant cough or SOB  BREAST: NEGATIVE for masses, tenderness or discharge  CV: NEGATIVE for chest pain,  "palpitations or peripheral edema  GI: NEGATIVE for nausea, abdominal pain, heartburn, or change in bowel habits  : NEGATIVE for frequency, dysuria, or hematuria  MUSCULOSKELETAL: NEGATIVE for significant arthralgias or myalgia  NEURO: NEGATIVE for weakness, dizziness or paresthesias  ENDOCRINE: NEGATIVE for temperature intolerance, skin/hair changes  HEME: NEGATIVE for bleeding problems  PSYCHIATRIC: NEGATIVE for changes in mood or affect    OBJECTIVE:   /72   Pulse 61   Temp 96.8  F (36  C)   Ht 1.626 m (5' 4\")   Wt 59.4 kg (131 lb)   SpO2 98%   BMI 22.49 kg/m   Estimated body mass index is 22.49 kg/m  as calculated from the following:    Height as of this encounter: 1.626 m (5' 4\").    Weight as of this encounter: 59.4 kg (131 lb).  EXAM:   GENERAL: healthy, alert and no distress  EYES: Eyes grossly normal to inspection, PERRL and conjunctivae and sclerae normal  HENT: ear canals and TM's normal, nose and mouth without ulcers or lesions  NECK: no adenopathy, no asymmetry, masses, or scars and thyroid normal to palpation  RESP: lungs clear to auscultation - no rales, rhonchi or wheezes  CV: regular rate and rhythm, normal S1 S2, no S3 or S4, no murmur, click or rub, no peripheral edema and peripheral pulses strong  ABDOMEN: soft, nontender, no hepatosplenomegaly, no masses and bowel sounds normal  MS: no gross musculoskeletal defects noted, no edema  SKIN: no suspicious lesions or rashes  NEURO: Normal strength and tone, mentation intact and speech normal  PSYCH: mentation appears normal, affect normal/bright    Diagnostic Test Results:  Labs reviewed in Epic    ASSESSMENT / PLAN:       ICD-10-CM    1. Lipid screening  Z13.220 Comprehensive metabolic panel     Lipid Profile   2. Encounter for Medicare annual wellness exam  Z00.00        Patient has been advised of split billing requirements and indicates understanding:     COUNSELING:  Reviewed preventive health counseling, as reflected in patient " "instructions    Estimated body mass index is 22.49 kg/m  as calculated from the following:    Height as of this encounter: 1.626 m (5' 4\").    Weight as of this encounter: 59.4 kg (131 lb).        He reports that he has never smoked. His smokeless tobacco use includes chew.  Tobacco Cessation Action Plan:       Appropriate preventive services were discussed with this patient, including applicable screening as appropriate for cardiovascular disease, diabetes, osteopenia/osteoporosis, and glaucoma.  As appropriate for age/gender, discussed screening for colorectal cancer, prostate cancer, breast cancer, and cervical cancer. Checklist reviewing preventive services available has been given to the patient.    Reviewed patients plan of care and provided an AVS. The Basic Care Plan (routine screening as documented in Health Maintenance) for Augie meets the Care Plan requirement. This Care Plan has been established and reviewed with the Patient.    Counseling Resources:  ATP IV Guidelines  Pooled Cohorts Equation Calculator  Breast Cancer Risk Calculator  BRCA-Related Cancer Risk Assessment: FHS-7 Tool  FRAX Risk Assessment  ICSI Preventive Guidelines  Dietary Guidelines for Americans, 2010  USDA's MyPlate  ASA Prophylaxis  Lung CA Screening    Jose Guadalupe Cotton MD  [facility]  "

## 2021-05-20 NOTE — PATIENT INSTRUCTIONS
Patient Education   Personalized Prevention Plan  You are due for the preventive services outlined below.  Your care team is available to assist you in scheduling these services.  If you have already completed any of these items, please share that information with your care team to update in your medical record.  Health Maintenance Due   Topic Date Due     Pneumococcal Vaccine (1 of 1 - PPSV23) Never done     Cholesterol Lab  01/30/2021     FALL RISK ASSESSMENT  05/12/2021

## 2021-05-20 NOTE — NURSING NOTE
"Chief Complaint   Patient presents with     Physical       Initial /72   Pulse 61   Temp 96.8  F (36  C)   Ht 1.626 m (5' 4\")   Wt 59.4 kg (131 lb)   SpO2 98%   BMI 22.49 kg/m   Estimated body mass index is 22.49 kg/m  as calculated from the following:    Height as of this encounter: 1.626 m (5' 4\").    Weight as of this encounter: 59.4 kg (131 lb).  Medication Reconciliation: complete  Julia Cardoza LPN  "

## 2021-10-03 ENCOUNTER — HEALTH MAINTENANCE LETTER (OUTPATIENT)
Age: 71
End: 2021-10-03

## 2021-12-01 ENCOUNTER — HOSPITAL ENCOUNTER (EMERGENCY)
Facility: HOSPITAL | Age: 71
Discharge: HOME | End: 2021-12-01
Attending: NURSE PRACTITIONER | Admitting: NURSE PRACTITIONER
Payer: COMMERCIAL

## 2021-12-01 ENCOUNTER — NURSE TRIAGE (OUTPATIENT)
Dept: FAMILY MEDICINE | Facility: OTHER | Age: 71
End: 2021-12-01
Payer: COMMERCIAL

## 2021-12-01 ENCOUNTER — APPOINTMENT (OUTPATIENT)
Dept: GENERAL RADIOLOGY | Facility: HOSPITAL | Age: 71
End: 2021-12-01
Attending: NURSE PRACTITIONER
Payer: COMMERCIAL

## 2021-12-01 VITALS
TEMPERATURE: 97.7 F | SYSTOLIC BLOOD PRESSURE: 114 MMHG | DIASTOLIC BLOOD PRESSURE: 64 MMHG | OXYGEN SATURATION: 96 % | RESPIRATION RATE: 17 BRPM | HEART RATE: 56 BPM

## 2021-12-01 DIAGNOSIS — R07.9 CHEST PAIN: ICD-10-CM

## 2021-12-01 DIAGNOSIS — R06.00 DYSPNEA: ICD-10-CM

## 2021-12-01 DIAGNOSIS — Z20.822 SUSPECTED 2019 NOVEL CORONAVIRUS INFECTION: Primary | ICD-10-CM

## 2021-12-01 LAB
ALBUMIN SERPL-MCNC: 3.7 G/DL (ref 3.4–5)
ALP SERPL-CCNC: 51 U/L (ref 40–150)
ALT SERPL W P-5'-P-CCNC: 17 U/L (ref 0–70)
ANION GAP SERPL CALCULATED.3IONS-SCNC: 7 MMOL/L (ref 3–14)
AST SERPL W P-5'-P-CCNC: 15 U/L (ref 0–45)
BASOPHILS # BLD AUTO: 0 10E3/UL (ref 0–0.2)
BASOPHILS NFR BLD AUTO: 0 %
BILIRUB SERPL-MCNC: 0.7 MG/DL (ref 0.2–1.3)
BUN SERPL-MCNC: 19 MG/DL (ref 7–30)
CALCIUM SERPL-MCNC: 8.8 MG/DL (ref 8.5–10.1)
CHLORIDE BLD-SCNC: 106 MMOL/L (ref 94–109)
CO2 SERPL-SCNC: 27 MMOL/L (ref 20–32)
CREAT SERPL-MCNC: 0.8 MG/DL (ref 0.66–1.25)
EOSINOPHIL # BLD AUTO: 0 10E3/UL (ref 0–0.7)
EOSINOPHIL NFR BLD AUTO: 1 %
ERYTHROCYTE [DISTWIDTH] IN BLOOD BY AUTOMATED COUNT: 12.6 % (ref 10–15)
GFR SERPL CREATININE-BSD FRML MDRD: >90 ML/MIN/1.73M2
GLUCOSE BLD-MCNC: 94 MG/DL (ref 70–99)
HCT VFR BLD AUTO: 41.5 % (ref 40–53)
HGB BLD-MCNC: 14.4 G/DL (ref 13.3–17.7)
IMM GRANULOCYTES # BLD: 0 10E3/UL
IMM GRANULOCYTES NFR BLD: 0 %
LIPASE SERPL-CCNC: 71 U/L (ref 73–393)
LYMPHOCYTES # BLD AUTO: 1.4 10E3/UL (ref 0.8–5.3)
LYMPHOCYTES NFR BLD AUTO: 24 %
MCH RBC QN AUTO: 30.1 PG (ref 26.5–33)
MCHC RBC AUTO-ENTMCNC: 34.7 G/DL (ref 31.5–36.5)
MCV RBC AUTO: 87 FL (ref 78–100)
MONOCYTES # BLD AUTO: 0.4 10E3/UL (ref 0–1.3)
MONOCYTES NFR BLD AUTO: 8 %
NEUTROPHILS # BLD AUTO: 3.8 10E3/UL (ref 1.6–8.3)
NEUTROPHILS NFR BLD AUTO: 67 %
NRBC # BLD AUTO: 0 10E3/UL
NRBC BLD AUTO-RTO: 0 /100
PLATELET # BLD AUTO: 177 10E3/UL (ref 150–450)
POTASSIUM BLD-SCNC: 3.8 MMOL/L (ref 3.4–5.3)
PROT SERPL-MCNC: 6.7 G/DL (ref 6.8–8.8)
RBC # BLD AUTO: 4.79 10E6/UL (ref 4.4–5.9)
SODIUM SERPL-SCNC: 140 MMOL/L (ref 133–144)
TROPONIN I SERPL HS-MCNC: 5 NG/L
WBC # BLD AUTO: 5.7 10E3/UL (ref 4–11)

## 2021-12-01 PROCEDURE — 85025 COMPLETE CBC W/AUTO DIFF WBC: CPT | Performed by: NURSE PRACTITIONER

## 2021-12-01 PROCEDURE — C9803 HOPD COVID-19 SPEC COLLECT: HCPCS

## 2021-12-01 PROCEDURE — 82040 ASSAY OF SERUM ALBUMIN: CPT | Performed by: NURSE PRACTITIONER

## 2021-12-01 PROCEDURE — 250N000013 HC RX MED GY IP 250 OP 250 PS 637: Performed by: NURSE PRACTITIONER

## 2021-12-01 PROCEDURE — 36415 COLL VENOUS BLD VENIPUNCTURE: CPT | Performed by: NURSE PRACTITIONER

## 2021-12-01 PROCEDURE — 99285 EMERGENCY DEPT VISIT HI MDM: CPT | Mod: 25

## 2021-12-01 PROCEDURE — 99284 EMERGENCY DEPT VISIT MOD MDM: CPT | Performed by: NURSE PRACTITIONER

## 2021-12-01 PROCEDURE — 83690 ASSAY OF LIPASE: CPT | Performed by: NURSE PRACTITIONER

## 2021-12-01 PROCEDURE — 87637 SARSCOV2&INF A&B&RSV AMP PRB: CPT | Performed by: NURSE PRACTITIONER

## 2021-12-01 PROCEDURE — 93005 ELECTROCARDIOGRAM TRACING: CPT

## 2021-12-01 PROCEDURE — 71045 X-RAY EXAM CHEST 1 VIEW: CPT

## 2021-12-01 PROCEDURE — 84484 ASSAY OF TROPONIN QUANT: CPT | Performed by: NURSE PRACTITIONER

## 2021-12-01 RX ORDER — MULTIVIT-MIN/IRON/FOLIC ACID/K 18-600-40
1 CAPSULE ORAL DAILY
COMMUNITY

## 2021-12-01 RX ORDER — ASPIRIN 81 MG/1
324 TABLET, CHEWABLE ORAL ONCE
Status: COMPLETED | OUTPATIENT
Start: 2021-12-01 | End: 2021-12-01

## 2021-12-01 RX ADMIN — ASPIRIN 81 MG CHEWABLE TABLET 324 MG: 81 TABLET CHEWABLE at 14:28

## 2021-12-01 ASSESSMENT — ENCOUNTER SYMPTOMS
VOMITING: 0
FATIGUE: 0
NUMBNESS: 0
DYSURIA: 0
APPETITE CHANGE: 0
DIFFICULTY URINATING: 0
NAUSEA: 0
FREQUENCY: 0
SHORTNESS OF BREATH: 1
SORE THROAT: 0
ABDOMINAL PAIN: 0
DIZZINESS: 0
FEVER: 0
RHINORRHEA: 0
MYALGIAS: 0
DIARRHEA: 0
COUGH: 0
PALPITATIONS: 0
ARTHRALGIAS: 0
CONSTIPATION: 0
HEADACHES: 0
HEMATURIA: 0
CHILLS: 0

## 2021-12-01 NOTE — ED NOTES
Ambulated into decon room independently with steady gait with c/o chest pain rated 0.5/10 off and on for the past 2 days. Also states he felt short of breath 2 days ago but does not currently feel short of breath. Denies cough, fever, body aches, chills, or diarrhea. Reports his family has a strong cardiac history and none of them lived past 71. Denies any cardiac history himself. Reports his wife is going through chemo for brain cancer so he is anxious about that. Reports he was at a [facility] this weekend and while he wore a mask no one else did so he is anxious about that also. EKG complete. Instructed on how to use wall call light in decon room. Verbalized understanding.

## 2021-12-01 NOTE — TELEPHONE ENCOUNTER
"Patient calling and requesting covid test. Patient triaged for covid and scheduled to be tested. Patient reports shortness of breath with exertion and some chest tightness and no other issues. States he is not having issues today and feels \"foolish for calling\".  States his wife is sick and that he might have picked it up from the hospital while bringing spouse to appointments. States when he was walking in the woods he was having shortness of breath. Patient is speaking in full sentences and no abnormal breath sounds heard while triaging. This writer asked patient if he would like to be seen by provider or covid test. Patient wanting covid test. After scheduling patient requesting if he could get an EKG. This writer asked again if he would like to be scheduled with PCP or covering provider. Patient declined and states he will do covid test first. Advised for patient to call back or be seen in ER/UC with new or worsening symptoms. Patient verbalized understanding.    COVID 19 Nurse Triage Plan/Patient Instructions    Please be aware that novel coronavirus (COVID-19) may be circulating in the community. If you develop symptoms such as fever, cough, or SOB or if you have concerns about the presence of another infection including coronavirus (COVID-19), please contact your health care provider or visit [email or web address].     Disposition/Instructions    Additional COVID19 information to add for patients.   How can I protect others?  If you have symptoms (fever, cough, body aches or trouble breathing): Stay home and away from others (self-isolate) until:    At least 10 days have passed since your symptoms started, And     You ve had no fever--and no medicine that reduces fever--for 1 full day (24 hours), And      Your other symptoms have resolved (gotten better).     If you don t have symptoms, but a test showed that you have COVID-19 (you tested positive):    Stay home and away from others (self-isolate). " "Follow the tips under \"How do I self-isolate?\" below for 10 days (20 days if you have a weak immune system).    You don't need to be retested for COVID-19 before going back to school or work. As long as you're fever-free and feeling better, you can go back to school, work and other activities after waiting the 10 or 20 days.     How do I self-isolate?    Stay in your own room, even for meals. Use your own bathroom if you can.     Stay away from others in your home. No hugging, kissing or shaking hands. No visitors.    Don t go to work, school or anywhere else.     Clean  high touch  surfaces often (doorknobs, counters, handles, etc.). Use a household cleaning spray or wipes. You ll find a full list on the EPA website:  www.epa.gov/pesticide-registration/list-n-disinfectants-use-against-sars-cov-2.    Cover your mouth and nose with a mask, tissue or washcloth to avoid spreading germs.    Wash your hands and face often. Use soap and water.    Caregivers in these groups are at risk for severe illness due to COVID-19:  o People 65 years and older  o People who live in a nursing home or long-term care facility  o People with chronic disease (lung, heart, cancer, diabetes, kidney, liver, immunologic)  o People who have a weakened immune system, including those who:  - Are in cancer treatment  - Take medicine that weakens the immune system, such as corticosteroids  - Had a bone marrow or organ transplant  - Have an immune deficiency  - Have poorly controlled HIV or AIDS  - Are obese (body mass index of 40 or higher)  - Smoke regularly    Caregivers should wear gloves while washing dishes, handling laundry and cleaning bedrooms and bathrooms.    Use caution when washing and drying laundry: Don t shake dirty laundry, and use the warmest water setting that you can.    For more tips, go to www.cdc.gov/coronavirus/2019-ncov/downloads/10Things.pdf.    How can I take care of myself?  1. Get lots of rest. Drink extra fluids " (unless a doctor has told you not to).     2. Take Tylenol (acetaminophen) for fever or pain. If you have liver or kidney problems, ask your family doctor if it s okay to take Tylenol.     Adults can take either:     650 mg (two 325 mg pills) every 4 to 6 hours, or     1,000 mg (two 500 mg pills) every 8 hours as needed.     Note: Don t take more than 3,000 mg in one day.   Acetaminophen is found in many medicines (both prescribed and over-the-counter medicines). Read all labels to be sure you don t take too much.     For children, check the Tylenol bottle for the right dose. The dose is based on the child s age or weight.    3. If you have other health problems (like cancer, heart failure, an organ transplant or severe kidney disease): Call your specialty clinic if you don t feel better in the next 2 days.    4. Know when to call 911: Emergency warning signs include:    Trouble breathing or shortness of breath    Pain or pressure in the chest that doesn t go away    Feeling confused like you haven t felt before, or not being able to wake up    Bluish-colored lips or face    What are the symptoms of COVID-19?     The most common symptoms are cough, fever and trouble breathing.     Less common symptoms include body aches, chills, diarrhea (loose, watery poops), fatigue (feeling very tired), headache, runny nose, sore throat and loss of smell.    COVID-19 can cause severe coughing (bronchitis) and lung infection (pneumonia).    How does it spread?     The virus may spread when a person coughs or sneezes into the air. The virus can travel about 6 feet this way, and it can live on surfaces.      Common  (household disinfectants) will kill the virus.    Who is at risk?  Anyone can catch COVID-19 if they re around someone who has the virus.    How can others protect themselves?     Stay away from people who have COVID-19 (or symptoms of COVID-19).    Wash hands often with soap and water. Or, use hand   with at least 60% alcohol.    Avoid touching the eyes, nose or mouth.     Wear a face mask when you go out in public, when sick or when caring for a sick person.    Where can I get more information?    [facility]: About COVID-19: [email or web address]    CDC: What to Do If You re Sick: www.cdc.gov/coronavirus/2019-ncov/about/steps-when-sick.html    CDC: Ending Home Isolation: www.cdc.gov/coronavirus/2019-ncov/hcp/disposition-in-home-patients.html     CDC: Caring for Someone: www.cdc.gov/coronavirus/2019-ncov/if-you-are-sick/care-for-someone.html     [facility]: Interim Guidance for Hospital Discharge to Home: www.[facility]./diseases/coronavirus/hcp/hospdischarge.pdf    [facility] clinical trials (COVID-19 research studies): [email or web address].[facility]-clinical-trials     Below are the COVID-19 hotlines at the [place] Department of Health ([facility]). Interpreters are available.   o For health questions: Call 674-980-0282 or 1-422.373.2768 (7 a.m. to 7 p.m.)  o For questions about schools and childcare: Call 208-481-6148 or 1-451.845.4098 (7 a.m. to 7 p.m.)          Thank you for taking steps to prevent the spread of this virus.  o Limit your contact with others.  o Wear a simple mask to cover your cough.  o Wash your hands well and often.    Resources    [facility]: About COVID-19: [email or web address]    CDC: What to Do If You're Sick: www.cdc.gov/coronavirus/2019-ncov/about/steps-when-sick.html    CDC: Ending Home Isolation: www.cdc.gov/coronavirus/2019-ncov/hcp/disposition-in-home-patients.html     CDC: Caring for Someone: www.cdc.gov/coronavirus/2019-ncov/if-you-are-sick/care-for-someone.html     [facility]: Interim Guidance for Hospital Discharge to Home: www.health.[facility].us/diseases/coronavirus/hcp/hospdischarge.pdf    [facility] clinical trials (COVID-19 research studies): [email or web address].[email or web address]/n-clinical-trials     Below are the COVID-19 hotlines at  the [facility] ([facility]). Interpreters are available.   o For health questions: Call 821-604-3345 or 1-718.117.1291 (7 a.m. to 7 p.m.)  o For questions about schools and childcare: Call 684-242-8283 or 1-204.930.1331 (7 a.m. to 7 p.m.)                       Reason for Disposition    COVID-19 Home Isolation, questions about    COVID-19 Testing, questions about    Additional Information    Negative: SEVERE difficulty breathing (e.g., struggling for each breath, speaks in single words)    Negative: Difficult to awaken or acting confused (e.g., disoriented, slurred speech)    Negative: Bluish (or gray) lips or face now    Negative: Shock suspected (e.g., cold/pale/clammy skin, too weak to stand, low BP, rapid pulse)    Negative: Sounds like a life-threatening emergency to the triager    Negative: [1] COVID-19 exposure AND [2] no symptoms    Negative: COVID-19 vaccine reaction suspected (e.g., fever, headache, muscle aches) occurring 1 to 3 days after getting vaccine    Negative: COVID-19 vaccine, questions about    Negative: [1] Lives with someone known to have influenza (flu test positive) AND [2] flu-like symptoms (e.g., cough, runny nose, sore throat, SOB; with or without fever)    Negative: [1] Adult with possible COVID-19 symptoms AND [2] triager concerned about severity of symptoms or other causes    Negative: COVID-19 and breastfeeding, questions about    Negative: SEVERE or constant chest pain or pressure (Exception: mild central chest pain, present only when coughing)    Negative: MODERATE difficulty breathing (e.g., speaks in phrases, SOB even at rest, pulse 100-120)    Negative: [1] Headache AND [2] stiff neck (can't touch chin to chest)    Negative: MILD difficulty breathing (e.g., minimal/no SOB at rest, SOB with walking, pulse <100)    Negative: Chest pain or pressure    Negative: Patient sounds very sick or weak to the triager    Negative: Fever > 103 F (39.4 C)    Negative: [1] Fever >  "101 F (38.3 C) AND [2] age > 60 years    Negative: [1] Fever > 100.0 F (37.8 C) AND [2] bedridden (e.g., nursing home patient, CVA, chronic illness, recovering from surgery)    Negative: HIGH RISK for severe COVID complications (e.g., age > 64 years, obesity with BMI > 25, pregnant, chronic lung disease or other chronic medical condition)  (Exception: Already seen by PCP and no new or worsening symptoms.)    Negative: [1] HIGH RISK patient AND [2] influenza is widespread in the community AND [3] ONE OR MORE respiratory symptoms: cough, sore throat, runny or stuffy nose    Negative: [1] HIGH RISK patient AND [2] influenza exposure within the last 7 days AND [3] ONE OR MORE respiratory symptoms: cough, sore throat, runny or stuffy nose    Negative: [1] COVID-19 infection suspected by caller or triager AND [2] mild symptoms (cough, fever, or others) AND [3] negative COVID-19 rapid test    Negative: Fever present > 3 days (72 hours)    Negative: [1] Fever returns after gone for over 24 hours AND [2] symptoms worse or not improved    Negative: [1] Continuous (nonstop) coughing interferes with work or school AND [2] no improvement using cough treatment per protocol    Negative: [1] COVID-19 diagnosed by positive lab test AND [2] NO symptoms (e.g., cough, fever, others)    Negative: Cough present > 3 weeks    Negative: [1] COVID-19 diagnosed by positive lab test AND [2] mild symptoms (e.g., cough, fever, others) AND [3] no complications or SOB    Negative: [1] COVID-19 diagnosed by doctor (or NP/PA) AND [2] mild symptoms (e.g., cough, fever, others) AND [3] no complications or SOB    Negative: [1] COVID-19 diagnosed AND [2] has mild nausea, vomiting or diarrhea    Answer Assessment - Initial Assessment Questions  1. COVID-19 DIAGNOSIS: \"Who made your Coronavirus (COVID-19) diagnosis?\" \"Was it confirmed by a positive lab test?\" If not diagnosed by a HCP, ask \"Are there lots of cases (community spread) where you live?\" (See " "public health department website, if unsure)      Not diagnosed  2. COVID-19 EXPOSURE: \"Was there any known exposure to COVID before the symptoms began?\" CDC Definition of close contact: within 6 feet (2 meters) for a total of 15 minutes or more over a 24-hour period.       no  3. ONSET: \"When did the COVID-19 symptoms start?\"       4 days ago  4. WORST SYMPTOM: \"What is your worst symptom?\" (e.g., cough, fever, shortness of breath, muscle aches)      Shortness of breath and chest tightness  5. COUGH: \"Do you have a cough?\" If Yes, ask: \"How bad is the cough?\"        no  6. FEVER: \"Do you have a fever?\" If Yes, ask: \"What is your temperature, how was it measured, and when did it start?\"      No did not check temp  7. RESPIRATORY STATUS: \"Describe your breathing?\" (e.g., shortness of breath, wheezing, unable to speak)       Shortness of breath with exertion   8. BETTER-SAME-WORSE: \"Are you getting better, staying the same or getting worse compared to yesterday?\"  If getting worse, ask, \"In what way?\"      same  9. HIGH RISK DISEASE: \"Do you have any chronic medical problems?\" (e.g., asthma, heart or lung disease, weak immune system, obesity, etc.)      no  10. PREGNANCY: \"Is there any chance you are pregnant?\" \"When was your last menstrual period?\"        NA  11. OTHER SYMPTOMS: \"Do you have any other symptoms?\"  (e.g., chills, fatigue, headache, loss of smell or taste, muscle pain, sore throat; new loss of smell or taste especially support the diagnosis of COVID-19)        no    Protocols used: CORONAVIRUS (COVID-19) DIAGNOSED OR [id number] 8.25.2021      "

## 2021-12-01 NOTE — DISCHARGE INSTRUCTIONS
(R07.9) Chest pain  (R06.00) Dyspnea  Very pleasant, alert, oriented 70-year old male presents ambulatory from home in no acute distress for evaluation of intermittent chest pressure and dyspnea ongoing since Sunday, possibly even later than that. No specific triggers. EKG without acute ischemic changes. Negative troponin. No acute electrolyte, renal or hepatic abnormalities. CXR is normal. COVID pending but low suspicion. He has normal vital signs - no respiratory distress or hypoxia. Plan for discharge home and outpatient stress testing given his family history and symptoms.           RETURN TO THE ED WITH NEW OR WORSENING SYMPTOMS.    ED FOLLOW-UP WITH YOUR PRIMARY CARE PROVIDER IN 7-10 DAYS.      Mariola Juan CNP    Results for orders placed or performed during the hospital encounter of 12/01/21   XR Chest Port 1 View     Status: None    Narrative    PROCEDURE:  XR CHEST PORT 1 VIEW    HISTORY:  chest pain, dyspnea.     COMPARISON:  May 20, 2019    FINDINGS:   The cardiac silhouette is normal in size. The pulmonary vasculature is  normal.  There is a calcified granuloma in the left lung. Calcified  hilar and mediastinal lymph nodes are noted. No pleural effusion or  pneumothorax.      Impression    IMPRESSION:  No acute cardiopulmonary disease.      BENEDICT TELLEZ MD         SYSTEM ID:  [id number]   Troponin I     Status: Normal   Result Value Ref Range    Troponin I High Sensitivity 5 <79 ng/L   Comprehensive metabolic panel     Status: Abnormal   Result Value Ref Range    Sodium 140 133 - 144 mmol/L    Potassium 3.8 3.4 - 5.3 mmol/L    Chloride 106 94 - 109 mmol/L    Carbon Dioxide (CO2) 27 20 - 32 mmol/L    Anion Gap 7 3 - 14 mmol/L    Urea Nitrogen 19 7 - 30 mg/dL    Creatinine 0.80 0.66 - 1.25 mg/dL    Calcium 8.8 8.5 - 10.1 mg/dL    Glucose 94 70 - 99 mg/dL    Alkaline Phosphatase 51 40 - 150 U/L    AST 15 0 - 45 U/L    ALT 17 0 - 70 U/L    Protein Total 6.7 (L) 6.8 - 8.8 g/dL    Albumin 3.7 3.4 - 5.0  g/dL    Bilirubin Total 0.7 0.2 - 1.3 mg/dL    GFR Estimate >90 >60 mL/min/1.73m2   Lipase     Status: Abnormal   Result Value Ref Range    Lipase 71 (L) 73 - 393 U/L   CBC with platelets and differential     Status: None   Result Value Ref Range    WBC Count 5.7 4.0 - 11.0 10e3/uL    RBC Count 4.79 4.40 - 5.90 10e6/uL    Hemoglobin 14.4 13.3 - 17.7 g/dL    Hematocrit 41.5 40.0 - 53.0 %    MCV 87 78 - 100 fL    MCH 30.1 26.5 - 33.0 pg    MCHC 34.7 31.5 - 36.5 g/dL    RDW 12.6 10.0 - 15.0 %    Platelet Count 177 150 - 450 10e3/uL    % Neutrophils 67 %    % Lymphocytes 24 %    % Monocytes 8 %    % Eosinophils 1 %    % Basophils 0 %    % Immature Granulocytes 0 %    NRBCs per 100 WBC 0 <1 /100    Absolute Neutrophils 3.8 1.6 - 8.3 10e3/uL    Absolute Lymphocytes 1.4 0.8 - 5.3 10e3/uL    Absolute Monocytes 0.4 0.0 - 1.3 10e3/uL    Absolute Eosinophils 0.0 0.0 - 0.7 10e3/uL    Absolute Basophils 0.0 0.0 - 0.2 10e3/uL    Absolute Immature Granulocytes 0.0 <=0.4 10e3/uL    Absolute NRBCs 0.0 10e3/uL   CBC with platelets differential     Status: None    Narrative    The following orders were created for panel order CBC with platelets differential.  Procedure                               Abnormality         Status                     ---------                               -----------         ------                     CBC with platelets and d...[[id number]]                      Final result                 Please view results for these tests on the individual orders.

## 2021-12-01 NOTE — ED PROVIDER NOTES
History     Chief Complaint   Patient presents with     Shortness of Breath     for last 2 days     Chest Pain     for last 2 days     HPI     Augie Hancock is a 70 year old male who presents ambulatory from home for evaluation of intermittent chest pressure and dyspnea since  - possibly even later than that. Currently 2/10 chest pressure - denies pain. Discomfort does not radiate. Pain is not pleuritic. Pain not necessarily associated with exertion- shoveled, snow blowed today and felt fine but yesterday he was out for a walk with wife and just felt like he couldn't get a deep breath.  He denies any associated nausea, vomiting, diaphoresis. He has not had any symptoms of recent illness including fever, chills, upper respiratory symptoms, cough, abdominal pain, n/v/d, urinary symptoms, rashes, headache/lightheaded/dizziness. No known exposures. He does state he is under a large amount of stress and anxiety. Wife recently diagnosed with brain cancer. He and his daughter are helping care for her. Today, after her virtual appointment symptoms started again so he came in for evaluation.    + family history: father, uncles, aunt, cousins  of MI prior to age 71  No personal history of htn, hyperlipidemia, DM, CAD.  Nonsmoker      Allergies:  No Known Allergies    Problem List:    Patient Active Problem List    Diagnosis Date Noted     Prostate cancer (H) 2020     Priority: Medium     Advanced directives, counseling/discussion 2016     Priority: Medium     Advance Care Planning 2016: ACP Review of Chart / Resources Provided:  Reviewed chart for advance care plan.  Augie Hancock has no plan or code status on file. Discussed available resources and provided with information. Pt states he has paperwork at home.  Added by Shena Singletary             ACP (advance care planning) 09/15/2016     Priority: Medium     Advance Care Planning 9/15/2016: ACP Review of Chart / Resources Provided:   Reviewed chart for advance care plan.  Augie Hancock has been provided information and resources to begin or update their advance care plan.  Added by Pilar Carbone             Prostate nodule 09/15/2016     Priority: Medium     Osteoarthrosis involving lower leg 05/06/2011     Priority: Medium     Overview:   IMO Update  IMO Update 10 2016  Replacing diagnoses that were inactivated after the 10/1/2021 regulatory import.          Past Medical History:    Past Medical History:   Diagnosis Date     Femoral fracture 4/18/2011     OA (osteoarthritis) of knee 4/18/2011     Routine general medical examination at a health care facility 5/7/2007     Tibia/fibula fracture 4/18/2011       Past Surgical History:    Past Surgical History:   Procedure Laterality Date     COLONOSCOPY  12/17/2012    Repeat in 10 years     COLONOSCOPY  2004     JOINT REPLACEMENT  4/2011     leg fracture repair secondary to trauma      bilateral     RELEASE CARPAL TUNNEL Right 4/7/2016    Procedure: RELEASE CARPAL TUNNEL;  Surgeon: Moise Izaguirre MD;  Location: [facility]       Family History:    Family History   Problem Relation Age of Onset     Cerebrovascular Disease Father      Heart Disease Father 70        MI; cause of death     Coronary Artery Disease Other      Diabetes Brother         type 2     Diabetes Mother         type 2     Diabetes Sister         type 2     Coronary Artery Disease Sister 64        MI       Social History:  Marital Status:   [2]  Social History     Tobacco Use     Smoking status: Never Smoker     Smokeless tobacco: Current User     Types: Chew     Tobacco comment: no passive exposure   Substance Use Topics     Alcohol use: No     Alcohol/week: 0.0 standard drinks     Drug use: Not on file        Medications:    Ascorbic Acid (VITAMIN C) 500 MG CAPS  ASPIRIN PO  GLUCOSAMINE-CHONDROIT-MSM-C-MN PO  UNABLE TO FIND  vitamin D3 (CHOLECALCIFEROL) 2000 units (50 mcg) tablet          Review of Systems    Constitutional: Negative for appetite change, chills, fatigue and fever.   HENT: Negative for congestion, ear pain, rhinorrhea and sore throat.    Respiratory: Positive for shortness of breath. Negative for cough.    Cardiovascular: Positive for chest pain (chest pressure). Negative for palpitations and leg swelling.   Gastrointestinal: Negative for abdominal pain, constipation, diarrhea, nausea and vomiting.   Genitourinary: Negative for difficulty urinating, dysuria, frequency, hematuria and urgency.   Musculoskeletal: Negative for arthralgias and myalgias.   Skin: Negative for rash.   Neurological: Negative for dizziness, syncope, numbness and headaches.       Physical Exam   BP: 109/67  Pulse: 73  Temp: 97.8  F (36.6  C)  Resp: 16  SpO2: 97 %      Physical Exam  Constitutional:       General: He is not in acute distress.     Appearance: Normal appearance. He is not ill-appearing or toxic-appearing.   HENT:      Head: Normocephalic.      Nose: Nose normal.      Mouth/Throat:      Lips: Pink.      Mouth: Mucous membranes are moist.      Tongue: Tongue does not deviate from midline.      Pharynx: Oropharynx is clear. Uvula midline.   Eyes:      General: Lids are normal.      Conjunctiva/sclera: Conjunctivae normal.   Cardiovascular:      Rate and Rhythm: Normal rate and regular rhythm.      Pulses:           Posterior tibial pulses are 2+ on the right side and 2+ on the left side.      Heart sounds: S1 normal and S2 normal. No murmur heard.  No friction rub. No gallop.    Pulmonary:      Effort: Pulmonary effort is normal.      Breath sounds: Normal breath sounds.   Abdominal:      General: Bowel sounds are normal.      Palpations: Abdomen is soft.      Tenderness: There is no abdominal tenderness. There is no guarding.   Musculoskeletal:      Right lower leg: No edema.      Left lower leg: No edema.      Comments: FROM of upper and lower extremities   Skin:     General: Skin is warm and dry.      Coloration:  Skin is not pale.   Neurological:      Mental Status: He is alert and oriented to person, place, and time.      Cranial Nerves: Cranial nerves are intact.      Motor: No weakness.      Gait: Gait is intact.   Psychiatric:         Speech: Speech normal.         Behavior: Behavior is cooperative.         ED Course     ED Course as of 12/01/21 1540   Wed Dec 01, 2021   1412      EKG Interpretation:     EKG Number: 1  Interpreted by Mariola Juan CNP  Symptoms at time of EKG: chest pain, dyspnea   Rhythm: normal sinus   Rate: normal  Axis: NORMAL  Ectopy: none  Conduction: normal ALVA 146 ms, normal QRS 94 ms, normal  ms, normal QTc 412 ms  ST Segments/ T Waves: No ST-T wave changes  Q Waves: none  Comparison to prior: Unchanged form 1/2020    Clinical Impression: sinus rhythm without acute ischemic changes.       Mariola Juan CNP on 12/1/2021 at 2:13 PM       1530 Re-evaluated. Having no symptoms at presents. REviewed all results and discharge plan.    Mariola Juan CNP on 12/1/2021 at 3:30 PM       Procedures           Results for orders placed or performed during the hospital encounter of 12/01/21 (from the past 24 hour(s))   CBC with platelets differential    Narrative    The following orders were created for panel order CBC with platelets differential.  Procedure                               Abnormality         Status                     ---------                               -----------         ------                     CBC with platelets and d...[[id number]]                      Final result                 Please view results for these tests on the individual orders.   Troponin I   Result Value Ref Range    Troponin I High Sensitivity 5 <79 ng/L   Comprehensive metabolic panel   Result Value Ref Range    Sodium 140 133 - 144 mmol/L    Potassium 3.8 3.4 - 5.3 mmol/L    Chloride 106 94 - 109 mmol/L    Carbon Dioxide (CO2) 27 20 - 32 mmol/L    Anion Gap 7 3 - 14 mmol/L    Urea Nitrogen 19 7 - 30  mg/dL    Creatinine 0.80 0.66 - 1.25 mg/dL    Calcium 8.8 8.5 - 10.1 mg/dL    Glucose 94 70 - 99 mg/dL    Alkaline Phosphatase 51 40 - 150 U/L    AST 15 0 - 45 U/L    ALT 17 0 - 70 U/L    Protein Total 6.7 (L) 6.8 - 8.8 g/dL    Albumin 3.7 3.4 - 5.0 g/dL    Bilirubin Total 0.7 0.2 - 1.3 mg/dL    GFR Estimate >90 >60 mL/min/1.73m2   Lipase   Result Value Ref Range    Lipase 71 (L) 73 - 393 U/L   CBC with platelets and differential   Result Value Ref Range    WBC Count 5.7 4.0 - 11.0 10e3/uL    RBC Count 4.79 4.40 - 5.90 10e6/uL    Hemoglobin 14.4 13.3 - 17.7 g/dL    Hematocrit 41.5 40.0 - 53.0 %    MCV 87 78 - 100 fL    MCH 30.1 26.5 - 33.0 pg    MCHC 34.7 31.5 - 36.5 g/dL    RDW 12.6 10.0 - 15.0 %    Platelet Count 177 150 - 450 10e3/uL    % Neutrophils 67 %    % Lymphocytes 24 %    % Monocytes 8 %    % Eosinophils 1 %    % Basophils 0 %    % Immature Granulocytes 0 %    NRBCs per 100 WBC 0 <1 /100    Absolute Neutrophils 3.8 1.6 - 8.3 10e3/uL    Absolute Lymphocytes 1.4 0.8 - 5.3 10e3/uL    Absolute Monocytes 0.4 0.0 - 1.3 10e3/uL    Absolute Eosinophils 0.0 0.0 - 0.7 10e3/uL    Absolute Basophils 0.0 0.0 - 0.2 10e3/uL    Absolute Immature Granulocytes 0.0 <=0.4 10e3/uL    Absolute NRBCs 0.0 10e3/uL   XR Chest Port 1 View    Narrative    PROCEDURE:  XR CHEST PORT 1 VIEW    HISTORY:  chest pain, dyspnea.     COMPARISON:  May 20, 2019    FINDINGS:   The cardiac silhouette is normal in size. The pulmonary vasculature is  normal.  There is a calcified granuloma in the left lung. Calcified  hilar and mediastinal lymph nodes are noted. No pleural effusion or  pneumothorax.      Impression    IMPRESSION:  No acute cardiopulmonary disease.      BENEDICT TELLEZ MD         SYSTEM ID:  [id number]       Medications   aspirin (ASA) chewable tablet 324 mg (324 mg Oral Given 12/1/21 9659)       Assessments & Plan (with Medical Decision Making)     I have reviewed the nursing notes.    I have reviewed the findings, diagnosis, plan  and need for follow up with the patient.  (R07.9) Chest pain  (R06.00) Dyspnea  Very pleasant, alert, oriented 70-year old male presents ambulatory from home in no acute distress for evaluation of intermittent chest pressure and dyspnea ongoing since Sunday, possibly even later than that. No specific triggers. EKG without acute ischemic changes. Negative troponin. No acute electrolyte, renal or hepatic abnormalities. CXR is normal. COVID pending but low suspicion. He has normal vital signs - no respiratory distress or hypoxia. Plan for discharge home and outpatient stress testing given his family history and symptoms.         Mariola Juan CNP    New Prescriptions    No medications on file       Final diagnoses:   Chest pain   Dyspnea       12/1/2021   [facility]     Mariola Juan CNP  12/01/21 [id number]

## 2021-12-01 NOTE — ED TRIAGE NOTES
Patient with intermittent chest tightness and SOB for last 2 days.   Is concerned about covid as spouse is currently on radiation for brain cancer.  He did attend the [employer] game on Saturday.

## 2021-12-02 LAB
FLUAV RNA SPEC QL NAA+PROBE: NEGATIVE
FLUBV RNA RESP QL NAA+PROBE: NEGATIVE
RSV RNA SPEC NAA+PROBE: NEGATIVE
SARS-COV-2 RNA RESP QL NAA+PROBE: NEGATIVE

## 2021-12-02 NOTE — ED NOTES
Discharge instructions reviewed. Verbalized understanding. Denies pain. Denies questions or concerns. Ambulated out of ED independently with steady gait.

## 2021-12-07 ENCOUNTER — HOSPITAL ENCOUNTER (OUTPATIENT)
Dept: CARDIOLOGY | Facility: HOSPITAL | Age: 71
Discharge: HOME | End: 2021-12-07
Attending: NURSE PRACTITIONER | Admitting: INTERNAL MEDICINE
Payer: COMMERCIAL

## 2021-12-07 DIAGNOSIS — R07.9 CHEST PAIN: ICD-10-CM

## 2021-12-07 DIAGNOSIS — R06.00 DYSPNEA: ICD-10-CM

## 2021-12-07 PROCEDURE — 93016 CV STRESS TEST SUPVJ ONLY: CPT | Performed by: INTERNAL MEDICINE

## 2021-12-07 PROCEDURE — 93017 CV STRESS TEST TRACING ONLY: CPT

## 2021-12-07 PROCEDURE — 93018 CV STRESS TEST I&R ONLY: CPT | Performed by: INTERNAL MEDICINE

## 2021-12-08 LAB
CV STRESS MAX HR HE: 120
RATE PRESSURE PRODUCT: NORMAL
STRESS ECHO BASELINE DIASTOLIC HE: 68
STRESS ECHO BASELINE HR: 59 BPM
STRESS ECHO BASELINE SYSTOLIC BP: 108
STRESS ECHO CALCULATED PERCENT HR: 79 %
STRESS ECHO LAST STRESS DIASTOLIC BP: 64
STRESS ECHO LAST STRESS SYSTOLIC BP: 160
STRESS ECHO POST ESTIMATED WORKLOAD: 10.1 METS
STRESS ECHO POST EXERCISE DUR MIN: 9 MIN
STRESS ECHO POST EXERCISE DUR SEC: 0 SEC
STRESS ECHO TARGET HR: 152

## 2022-01-04 ENCOUNTER — TELEPHONE (OUTPATIENT)
Dept: FAMILY MEDICINE | Facility: OTHER | Age: 72
End: 2022-01-04
Payer: COMMERCIAL

## 2022-01-04 NOTE — TELEPHONE ENCOUNTER
Pt was in ER on 12/1/21 and he had stress test done 12/7/21, can you review pt has not gotten results.

## 2022-07-09 ENCOUNTER — HEALTH MAINTENANCE LETTER (OUTPATIENT)
Age: 72
End: 2022-07-09

## 2022-08-16 ENCOUNTER — TELEPHONE (OUTPATIENT)
Dept: FAMILY MEDICINE | Facility: OTHER | Age: 72
End: 2022-08-16

## 2022-08-16 DIAGNOSIS — C61 MALIGNANT NEOPLASM OF PROSTATE (H): Primary | ICD-10-CM

## 2022-08-16 NOTE — TELEPHONE ENCOUNTER
Lab orders from [facility], called to make apt for lab only. LVM to call back to make apt.

## 2022-08-17 ENCOUNTER — TELEPHONE (OUTPATIENT)
Dept: FAMILY MEDICINE | Facility: OTHER | Age: 72
End: 2022-08-17

## 2022-08-18 ENCOUNTER — TELEPHONE (OUTPATIENT)
Dept: FAMILY MEDICINE | Facility: OTHER | Age: 72
End: 2022-08-18

## 2022-08-18 NOTE — TELEPHONE ENCOUNTER
Spoke with pt and advised of appointments. Stated he did not know about the lab apt scheduled and there is no documentation on it so I advised him of this.

## 2022-09-04 ENCOUNTER — HEALTH MAINTENANCE LETTER (OUTPATIENT)
Age: 72
End: 2022-09-04

## 2022-10-13 NOTE — PATIENT INSTRUCTIONS
Patient Education   Personalized Prevention Plan  You are due for the preventive services outlined below.  Your care team is available to assist you in scheduling these services.  If you have already completed any of these items, please share that information with your care team to update in your medical record.  Health Maintenance Due   Topic Date Due     Hepatitis B Vaccine (1 of 3 - 3-dose series) Never done     Pneumococcal Vaccine (1 - PCV) Never done     PHQ-2 (once per calendar year)  01/01/2022     Annual Wellness Visit  05/20/2022     Cholesterol Lab  05/20/2022     FALL RISK ASSESSMENT  05/20/2022     COVID-19 Vaccine (5 - Booster for Moderna series) 06/21/2022     Flu Vaccine (1) 09/01/2022

## 2022-10-25 ENCOUNTER — OFFICE VISIT (OUTPATIENT)
Dept: FAMILY MEDICINE | Facility: OTHER | Age: 72
End: 2022-10-25
Attending: FAMILY MEDICINE
Payer: COMMERCIAL

## 2022-10-25 VITALS
BODY MASS INDEX: 23.56 KG/M2 | SYSTOLIC BLOOD PRESSURE: 128 MMHG | DIASTOLIC BLOOD PRESSURE: 64 MMHG | OXYGEN SATURATION: 98 % | HEIGHT: 64 IN | TEMPERATURE: 97.1 F | HEART RATE: 64 BPM | WEIGHT: 138 LBS

## 2022-10-25 DIAGNOSIS — Z23 NEED FOR VACCINATION: ICD-10-CM

## 2022-10-25 DIAGNOSIS — H91.93 BILATERAL HEARING LOSS, UNSPECIFIED HEARING LOSS TYPE: ICD-10-CM

## 2022-10-25 DIAGNOSIS — Z12.11 COLON CANCER SCREENING: ICD-10-CM

## 2022-10-25 DIAGNOSIS — Z00.00 ENCOUNTER FOR MEDICARE ANNUAL WELLNESS EXAM: Primary | ICD-10-CM

## 2022-10-25 DIAGNOSIS — C61 PROSTATE CANCER (H): ICD-10-CM

## 2022-10-25 DIAGNOSIS — Z72.0 TOBACCO ABUSE: ICD-10-CM

## 2022-10-25 DIAGNOSIS — Z13.220 LIPID SCREENING: ICD-10-CM

## 2022-10-25 LAB
ALBUMIN SERPL BCG-MCNC: 4.1 G/DL (ref 3.5–5.2)
ALP SERPL-CCNC: 51 U/L (ref 40–129)
ALT SERPL W P-5'-P-CCNC: 11 U/L (ref 10–50)
ANION GAP SERPL CALCULATED.3IONS-SCNC: 11 MMOL/L (ref 7–15)
AST SERPL W P-5'-P-CCNC: 20 U/L (ref 10–50)
BILIRUB SERPL-MCNC: 0.5 MG/DL
BUN SERPL-MCNC: 21.5 MG/DL (ref 8–23)
CALCIUM SERPL-MCNC: 9.2 MG/DL (ref 8.8–10.2)
CHLORIDE SERPL-SCNC: 102 MMOL/L (ref 98–107)
CHOLEST SERPL-MCNC: 195 MG/DL
CREAT SERPL-MCNC: 0.76 MG/DL (ref 0.67–1.17)
DEPRECATED HCO3 PLAS-SCNC: 25 MMOL/L (ref 22–29)
GFR SERPL CREATININE-BSD FRML MDRD: >90 ML/MIN/1.73M2
GLUCOSE SERPL-MCNC: 84 MG/DL (ref 70–99)
HDLC SERPL-MCNC: 66 MG/DL
LDLC SERPL CALC-MCNC: 114 MG/DL
NONHDLC SERPL-MCNC: 129 MG/DL
POTASSIUM SERPL-SCNC: 4.3 MMOL/L (ref 3.4–5.3)
PROT SERPL-MCNC: 6.7 G/DL (ref 6.4–8.3)
SODIUM SERPL-SCNC: 138 MMOL/L (ref 136–145)
TRIGL SERPL-MCNC: 75 MG/DL

## 2022-10-25 PROCEDURE — G0439 PPPS, SUBSEQ VISIT: HCPCS | Performed by: FAMILY MEDICINE

## 2022-10-25 PROCEDURE — 90677 PCV20 VACCINE IM: CPT

## 2022-10-25 PROCEDURE — 82040 ASSAY OF SERUM ALBUMIN: CPT | Mod: ZL | Performed by: FAMILY MEDICINE

## 2022-10-25 PROCEDURE — 36415 COLL VENOUS BLD VENIPUNCTURE: CPT | Mod: ZL | Performed by: FAMILY MEDICINE

## 2022-10-25 PROCEDURE — 80061 LIPID PANEL: CPT | Mod: ZL | Performed by: FAMILY MEDICINE

## 2022-10-25 PROCEDURE — 80053 COMPREHEN METABOLIC PANEL: CPT | Mod: ZL | Performed by: FAMILY MEDICINE

## 2022-10-25 RX ORDER — POLYETHYLENE GLYCOL 3350 17 G
2 POWDER IN PACKET (EA) ORAL
Qty: 168 LOZENGE | Refills: 3 | Status: SHIPPED | OUTPATIENT
Start: 2022-10-25

## 2022-10-25 ASSESSMENT — PAIN SCALES - GENERAL: PAINLEVEL: NO PAIN (0)

## 2022-10-25 ASSESSMENT — ACTIVITIES OF DAILY LIVING (ADL): CURRENT_FUNCTION: NO ASSISTANCE NEEDED

## 2022-10-25 NOTE — NURSING NOTE
"Chief Complaint   Patient presents with     Physical       Initial /64   Pulse 64   Temp 97.1  F (36.2  C) (Tympanic)   Ht 1.626 m (5' 4\")   Wt 62.6 kg (138 lb)   SpO2 98%   BMI 23.69 kg/m   Estimated body mass index is 23.69 kg/m  as calculated from the following:    Height as of this encounter: 1.626 m (5' 4\").    Weight as of this encounter: 62.6 kg (138 lb).  Medication Reconciliation: complete  Julia Cardoza LPN  "

## 2022-10-25 NOTE — LETTER
October 26, 2022      Augie Hancock  [address]  [place] [address]        Dear ,    We are writing to inform you of your test results.    Your test results fall within the expected range(s) or remain unchanged from previous results.  Please continue with current treatment plan.    Resulted Orders   Comprehensive metabolic panel   Result Value Ref Range    Sodium 138 136 - 145 mmol/L    Potassium 4.3 3.4 - 5.3 mmol/L    Chloride 102 98 - 107 mmol/L    Carbon Dioxide (CO2) 25 22 - 29 mmol/L    Anion Gap 11 7 - 15 mmol/L    Urea Nitrogen 21.5 8.0 - 23.0 mg/dL    Creatinine 0.76 0.67 - 1.17 mg/dL    Calcium 9.2 8.8 - 10.2 mg/dL    Glucose 84 70 - 99 mg/dL    Alkaline Phosphatase 51 40 - 129 U/L    AST 20 10 - 50 U/L    ALT 11 10 - 50 U/L    Protein Total 6.7 6.4 - 8.3 g/dL    Albumin 4.1 3.5 - 5.2 g/dL    Bilirubin Total 0.5 <=1.2 mg/dL    GFR Estimate >90 >60 mL/min/1.73m2      Comment:      Effective December 21, 2021 eGFRcr in adults is calculated using the 2021 CKD-EPI creatinine equation which includes age and gender (Olivia et al., NEJ, DOI: 10.1056/VWTBpn7583239)   Lipid Profile   Result Value Ref Range    Cholesterol 195 <200 mg/dL    Triglycerides 75 <150 mg/dL    Direct Measure HDL 66 >=40 mg/dL    LDL Cholesterol Calculated 114 (H) <=100 mg/dL    Non HDL Cholesterol 129 <130 mg/dL    Narrative    Cholesterol  Desirable:  <200 mg/dL    Triglycerides  Normal:  Less than 150 mg/dL  Borderline High:  150-199 mg/dL  High:  200-499 mg/dL  Very High:  Greater than or equal to 500 mg/dL    Direct Measure HDL  Female:  Greater than or equal to 50 mg/dL   Male:  Greater than or equal to 40 mg/dL    LDL Cholesterol  Desirable:  <100mg/dL  Above Desirable:  100-129 mg/dL   Borderline High:  130-159 mg/dL   High:  160-189 mg/dL   Very High:  >= 190 mg/dL    Non HDL Cholesterol  Desirable:  130 mg/dL  Above Desirable:  130-159 mg/dL  Borderline High:  160-189 mg/dL  High:  190-219 mg/dL  Very High:   Greater than or equal to 220 mg/dL       If you have any questions or concerns, please call the clinic at the number listed above.       Sincerely,      Jose Guadalupe Cotton MD

## 2022-10-25 NOTE — PROGRESS NOTES
"SUBJECTIVE:   Augie is a 71 year old who presents for Preventive Visit.        Are you in the first 12 months of your Medicare coverage?  No    Healthy Habits:     In general, how would you rate your overall health?  Good    Frequency of exercise:  None    Do you usually eat at least 4 servings of fruit and vegetables a day, include whole grains    & fiber and avoid regularly eating high fat or \"junk\" foods?  Yes    Taking medications regularly:  Not Applicable    Medication side effects:  Not applicable    Ability to successfully perform activities of daily living:  No assistance needed    Home Safety:  No safety concerns identified    Hearing Impairment:  No hearing concerns    In the past 6 months, have you been bothered by leaking of urine?  No    In general, how would you rate your overall mental or emotional health?  Good      PHQ-2 Total Score: 2    Additional concerns today:  No    Do you feel safe in your environment? Yes    Have you ever done Advance Care Planning? (For example, a Health Directive, POLST, or a discussion with a medical provider or your loved ones about your wishes): Yes, patient states has an Advance Care Planning document and will bring a copy to the clinic.       Fall risk  Fallen 2 or more times in the past year?: No  Any fall with injury in the past year?: No    Cognitive Screening   1) Repeat 3 items (Leader, Season, Table)    2) Clock draw: NORMAL  3) 3 item recall: Recalls 2 objects   Results: NORMAL clock, 1-2 items recalled: COGNITIVE IMPAIRMENT LESS LIKELY    Mini-CogTM Copyright CATALINA Vaz. Licensed by the author for use in [facility]; reprinted with permission (becky@.[facility]). All rights reserved.      Do you have sleep apnea, excessive snoring or daytime drowsiness?: no    Reviewed and updated as needed this visit by clinical staff   Tobacco  Allergies  Meds              Reviewed and updated as needed this visit by Provider                 Social History "     Tobacco Use     Smoking status: Never     Smokeless tobacco: Current     Types: Chew     Tobacco comments:     no passive exposure   Substance Use Topics     Alcohol use: No     Alcohol/week: 0.0 standard drinks         Alcohol Use 10/25/2022   Prescreen: >3 drinks/day or >7 drinks/week? Not Applicable               Current providers sharing in care for this patient include:   Patient Care Team:  Jose Guadalupe Cotton MD as PCP - General  Jose Guadalupe Cotton MD as Assigned PCP    The following health maintenance items are reviewed in Epic and correct as of today:  Health Maintenance   Topic Date Due     HEPATITIS B IMMUNIZATION (1 of 3 - 3-dose series) Never done     Pneumococcal Vaccine: 65+ Years (1 - PCV) Never done     MEDICARE ANNUAL WELLNESS VISIT  05/20/2022     LIPID  05/20/2022     COLORECTAL CANCER SCREENING  12/17/2022     FALL RISK ASSESSMENT  10/25/2023     DTAP/TDAP/TD IMMUNIZATION (3 - Td or Tdap) 05/29/2025     ADVANCE CARE PLANNING  10/25/2027     HEPATITIS C SCREENING  Completed     PHQ-2 (once per calendar year)  Completed     INFLUENZA VACCINE  Completed     ZOSTER IMMUNIZATION  Completed     AORTIC ANEURYSM SCREENING (SYSTEM ASSIGNED)  Completed     COVID-19 Vaccine  Completed     IPV IMMUNIZATION  Aged Out     MENINGITIS IMMUNIZATION  Aged Out     Lab work is in process          Review of Systems  CONSTITUTIONAL: NEGATIVE for fever, chills, change in weight  INTEGUMENTARY/SKIN: NEGATIVE for worrisome rashes, moles or lesions  EYES: NEGATIVE for vision changes or irritation  ENT/MOUTH: NEGATIVE for ear, mouth and throat problems  RESP: NEGATIVE for significant cough or SOB  BREAST: NEGATIVE for masses, tenderness or discharge  CV: NEGATIVE for chest pain, palpitations or peripheral edema  GI: NEGATIVE for nausea, abdominal pain, heartburn, or change in bowel habits  : NEGATIVE for frequency, dysuria, or hematuria  MUSCULOSKELETAL: NEGATIVE for significant arthralgias or myalgia  NEURO:  "NEGATIVE for weakness, dizziness or paresthesias  ENDOCRINE: NEGATIVE for temperature intolerance, skin/hair changes  HEME: NEGATIVE for bleeding problems  PSYCHIATRIC: NEGATIVE for changes in mood or affect    OBJECTIVE:   /64   Pulse 64   Temp 97.1  F (36.2  C) (Tympanic)   Ht 1.626 m (5' 4\")   Wt 62.6 kg (138 lb)   SpO2 98%   BMI 23.69 kg/m   Estimated body mass index is 23.69 kg/m  as calculated from the following:    Height as of this encounter: 1.626 m (5' 4\").    Weight as of this encounter: 62.6 kg (138 lb).  Physical Exam  GENERAL: healthy, alert and no distress  EYES: Eyes grossly normal to inspection, PERRL and conjunctivae and sclerae normal  HENT: ear canals and TM's normal, nose and mouth without ulcers or lesions  NECK: no adenopathy, no asymmetry, masses, or scars and thyroid normal to palpation  RESP: lungs clear to auscultation - no rales, rhonchi or wheezes  CV: regular rate and rhythm, normal S1 S2, no S3 or S4, no murmur, click or rub, no peripheral edema and peripheral pulses strong  ABDOMEN: soft, nontender, no hepatosplenomegaly, no masses and bowel sounds normal  MS: no gross musculoskeletal defects noted, no edema  SKIN: no suspicious lesions or rashes  NEURO: Normal strength and tone, mentation intact and speech normal  PSYCH: mentation appears normal, affect normal/bright    Diagnostic Test Results:  Labs reviewed in Epic    ASSESSMENT / PLAN:       ICD-10-CM    1. Encounter for Medicare annual wellness exam  Z00.00       2. Lipid screening  Z13.220 Comprehensive metabolic panel     Lipid Profile      3. Prostate cancer (H)  C61           Colon screen set up.     Prevnar 20 given.      Hearing test for hard of hearing.      Nicotine lozenges for the chewing.      COUNSELING:  Reviewed preventive health counseling, as reflected in patient instructions    Estimated body mass index is 23.69 kg/m  as calculated from the following:    Height as of this encounter: 1.626 m (5' 4\").   "  Weight as of this encounter: 62.6 kg (138 lb).        He reports that he has never smoked. His smokeless tobacco use includes chew.      Appropriate preventive services were discussed with this patient, including applicable screening as appropriate for cardiovascular disease, diabetes, osteopenia/osteoporosis, and glaucoma.  As appropriate for age/gender, discussed screening for colorectal cancer, prostate cancer, breast cancer, and cervical cancer. Checklist reviewing preventive services available has been given to the patient.    Reviewed patients plan of care and provided an AVS. The Basic Care Plan (routine screening as documented in Health Maintenance) for Augie meets the Care Plan requirement. This Care Plan has been established and reviewed with the Patient.    Counseling Resources:  ATP IV Guidelines  Pooled Cohorts Equation Calculator  Breast Cancer Risk Calculator  Breast Cancer: Medication to Reduce Risk  FRAX Risk Assessment  ICSI Preventive Guidelines  Dietary Guidelines for Americans, 2010  USDA's MyPlate  ASA Prophylaxis  Lung CA Screening    Jose Guadalupe Cotton MD  [facility]    Identified Health Risks:

## 2022-11-10 ENCOUNTER — OFFICE VISIT (OUTPATIENT)
Dept: AUDIOLOGY | Facility: OTHER | Age: 72
End: 2022-11-10
Attending: AUDIOLOGIST
Payer: COMMERCIAL

## 2022-11-10 DIAGNOSIS — H90.3 SENSORINEURAL HEARING LOSS (SNHL) OF BOTH EARS: Primary | ICD-10-CM

## 2022-11-10 DIAGNOSIS — H91.93 BILATERAL HEARING LOSS, UNSPECIFIED HEARING LOSS TYPE: ICD-10-CM

## 2022-11-10 PROCEDURE — 92550 TYMPANOMETRY & REFLEX THRESH: CPT | Performed by: AUDIOLOGIST

## 2022-11-10 PROCEDURE — 92557 COMPREHENSIVE HEARING TEST: CPT | Performed by: AUDIOLOGIST

## 2022-11-10 NOTE — PROGRESS NOTES
Audiology Evaluation Completed. Please refer SCANNED AUDIOGRAM and/or TYMPANOGRAM for BACKGROUND, RESULTS, RECOMMENDATIONS.      Danielle GOMEZ, [facility]  Audiologist #[id number]

## 2022-11-14 ENCOUNTER — MYC MEDICAL ADVICE (OUTPATIENT)
Dept: FAMILY MEDICINE | Facility: OTHER | Age: 72
End: 2022-11-14

## 2022-11-15 ENCOUNTER — TELEPHONE (OUTPATIENT)
Dept: FAMILY MEDICINE | Facility: OTHER | Age: 72
End: 2022-11-15

## 2022-11-15 ENCOUNTER — TELEPHONE (OUTPATIENT)
Dept: PHARMACY | Facility: PHYSICIAN GROUP | Age: 72
End: 2022-11-15

## 2022-11-15 ENCOUNTER — VIRTUAL VISIT (OUTPATIENT)
Dept: FAMILY MEDICINE | Facility: OTHER | Age: 72
End: 2022-11-15
Attending: NURSE PRACTITIONER
Payer: COMMERCIAL

## 2022-11-15 DIAGNOSIS — U07.1 INFECTION DUE TO 2019 NOVEL CORONAVIRUS: Primary | ICD-10-CM

## 2022-11-15 PROCEDURE — 99441 PR PHYSICIAN TELEPHONE EVALUATION 5-10 MIN: CPT | Mod: 95 | Performed by: NURSE PRACTITIONER

## 2022-11-15 NOTE — TELEPHONE ENCOUNTER
Paxlovid drug-drug interaction check:    No clinically relevant drug-drug interactions between this patient's medication list and Paxlovid have been identified.     Azael Rain, PharmD  Medication Therapy Management Pharmacist  [facility]  Office phone: [phone]

## 2022-11-15 NOTE — PROGRESS NOTES
Augie is a 71 year old who is being evaluated via a billable telephone visit.      What phone number would you like to be contacted at? [phone]  How would you like to obtain your AVS? MyChart    Assessment & Plan     Infection due to 2019 novel coronavirus  Please see the CDC and/or the [place] Dept of Health websites for additional information about COVID and/or ANTIVIRAL therapy.   New medication education completed with potential risks, benefits, administration, and potential side effects.   No medication adjustments are needed to start your PAXLOVID.   - nirmatrelvir and ritonavir (PAXLOVID) therapy pack; Take 3 tablets by mouth 2 times daily for 5 days (Take 2 Nirmatrelvir tablets and 1 Ritonavir tablet twice daily for 5 days)    Prescription drug management      No follow-ups on file.    Marycruz Stallworth, [facility] [place] [facility]    Subjective    Augie is a 71 year old, presenting for the following health issues:  No chief complaint on file.      HPI     Underlying Medical Conditions: age   Patient testing positive on yesterday   Test by:  At home covid test  Start of Symptoms: 11/12  Covid 19 Vaccination Status:  Moderna initial X2 and booster X2. Pfizer booster bivalent X1  Are you pregnant, breastfeeding or trying to conceive? No    COVID-19 Symptom Review  Symptoms Start Date?  11/12     Are any of the following symptoms significant for you?    New or worsening difficulty breathing? No    Cough? yes     Dry or Productive?  both    Fever?  No      Highest temp past 24 hours?  98.1    Chills?  No    Headache:  Very slight headache    Sore throat: no    Chest pain: No    Diarrhea: no     Body aches?  no     Nasal congestion or drainage?  yes     What treatments has patient tried?  Cough drops  Does patient live in a nursing home, group home, or shelter? no   Does patient have a way to get food/medications during quarantine? yes    Feels pretty good. Feels better outside in the cold air. Has  been snow blowing and tolerating this well.     Appointment Scheduled:  11/15/22 at 11AM with Marycruz Stallworth    Pharmacy: [facility]    Confirmed with Pharmacy: Paxlovid & Molnupiravir in stock  Last Comprehensive Metabolic Panel:  Sodium   Date Value Ref Range Status   10/25/2022 138 136 - 145 mmol/L Final   05/20/2021 138 133 - 144 mmol/L Final     Potassium   Date Value Ref Range Status   10/25/2022 4.3 3.4 - 5.3 mmol/L Final   12/01/2021 3.8 3.4 - 5.3 mmol/L Final   05/20/2021 4.1 3.4 - 5.3 mmol/L Final     Chloride   Date Value Ref Range Status   10/25/2022 102 98 - 107 mmol/L Final   12/01/2021 106 94 - 109 mmol/L Final   05/20/2021 104 94 - 109 mmol/L Final     Carbon Dioxide   Date Value Ref Range Status   05/20/2021 27 20 - 32 mmol/L Final     Carbon Dioxide (CO2)   Date Value Ref Range Status   10/25/2022 25 22 - 29 mmol/L Final   12/01/2021 27 20 - 32 mmol/L Final     Anion Gap   Date Value Ref Range Status   10/25/2022 11 7 - 15 mmol/L Final   12/01/2021 7 3 - 14 mmol/L Final   05/20/2021 7 3 - 14 mmol/L Final     Glucose   Date Value Ref Range Status   10/25/2022 84 70 - 99 mg/dL Final   12/01/2021 94 70 - 99 mg/dL Final   05/20/2021 90 70 - 99 mg/dL Final     Comment:     Fasting specimen     Urea Nitrogen   Date Value Ref Range Status   10/25/2022 21.5 8.0 - 23.0 mg/dL Final   12/01/2021 19 7 - 30 mg/dL Final   05/20/2021 24 7 - 30 mg/dL Final     Creatinine   Date Value Ref Range Status   10/25/2022 0.76 0.67 - 1.17 mg/dL Final   05/20/2021 0.82 0.66 - 1.25 mg/dL Final     GFR Estimate   Date Value Ref Range Status   10/25/2022 >90 >60 mL/min/1.73m2 Final     Comment:     Effective December 21, 2021 eGFRcr in adults is calculated using the 2021 CKD-EPI creatinine equation which includes age and gender (Olivia et al., NEJM, DOI: 10.1056/TIVFyg5521229)   05/20/2021 >90 >60 mL/min/[1.73_m2] Final     Comment:     Non  GFR Calc  Starting 12/18/2018, serum creatinine based  estimated GFR (eGFR) will be   calculated using the Chronic Kidney Disease Epidemiology Collaboration   (CKD-EPI) equation.       Calcium   Date Value Ref Range Status   10/25/2022 9.2 8.8 - 10.2 mg/dL Final   05/20/2021 8.8 8.5 - 10.1 mg/dL Final     Liver Function Studies - Recent Labs   Lab Test 10/25/22  0901   PROTTOTAL 6.7   ALBUMIN 4.1   BILITOTAL 0.5   ALKPHOS 51   AST 20   ALT 11     Current Outpatient Medications   Medication     Ascorbic Acid (VITAMIN C) 500 MG CAPS     ASPIRIN PO     GLUCOSAMINE-CHONDROIT-MSM-C-MN PO     nicotine (COMMIT) 2 MG lozenge     nicotine (NICORETTE) 4 MG lozenge     UNABLE TO FIND     vitamin D3 (CHOLECALCIFEROL) 2000 units (50 mcg) tablet     No current facility-administered medications for this visit.     Azael Rain, [facility]     ES     9:31 AM  Note  Paxlovid drug-drug interaction check:     No clinically relevant drug-drug interactions between this patient's medication list and Paxlovid have been identified.      Azael Rain, PharmD  Medication Therapy Management Pharmacist  [facility]  Office phone: [phone]                     Review of Systems   Constitutional, HEENT, cardiovascular, pulmonary, gi and gu systems are negative, except as otherwise noted.      Objective           Vitals:  No vitals were obtained today due to virtual visit.    Physical Exam   healthy, alert and no distress  PSYCH: Alert and oriented times 3; coherent speech, normal   rate and volume, able to articulate logical thoughts, able   to abstract reason, no tangential thoughts, no hallucinations   or delusions  His affect is normal  RESP: No cough, no audible wheezing, able to talk in full sentences  Remainder of exam unable to be completed due to telephone visits                Phone call duration: 9 minutes

## 2022-11-15 NOTE — TELEPHONE ENCOUNTER
Call placed to patient to discuss positive covid 19 results.   Underlying Medical Conditions: age   Patient testing positive on yesterday   Test by:  At home covid test  Start of Symptoms: 11/12  Covid 19 Vaccination Status:  Moderna initial X2 and booster X2. Pfizer booster bivalent X1  Are you pregnant, breastfeeding or trying to conceive? No    COVID-19 Symptom Review  Symptoms Start Date?  11/12     Are any of the following symptoms significant for you?    New or worsening difficulty breathing? No    Cough? yes     Dry or Productive?  both    Fever?  No      Highest temp past 24 hours?  98.1    Chills?  No    Headache:  Very slight headache    Sore throat: no    Chest pain: No    Diarrhea: no     Body aches?  no     Nasal congestion or drainage?  yes     What treatments has patient tried?  Cough drops  Does patient live in a nursing home, group home, or shelter? no   Does patient have a way to get food/medications during quarantine? yes    Appointment Scheduled:  11/15/22 at 11AM with Marycruz Stallworth    Pharmacy: [facility]    Confirmed with Pharmacy: Paxlovid & Molnupiravir in stock

## 2022-11-15 NOTE — PATIENT INSTRUCTIONS
COVID-19 Outpatient Treatments  Your care team can help you find the best treatments for COVID-19. Talk to a health care provider or refer to the FDA medicine fact sheets below.    Important: You CAN'T have molnupiravir or Paxlovid if you are starting the medicine more than 5 days after your symptoms have started.  Paxlovid: https://www.fda.gov/media/308795/download  Molnupiravir: https://www.fda.gov/media/058506/download  Monoclonal antibodies: https://combatcovid.hhs.gov/what-are-monoclonal-antibodies  Paxlovid (nimatrelvir and ritonavir)  How it works  Two medicines (nirmatrelvir and ritonavir) are taken together. They stop the virus from growing. Less amount of virus is easier for your body to fight.  Benefits  Lowers risk of a hospital stay or death from COVID-19.  How to take    Medicine comes in a daily container with both medicine tablets. Take by mouth twice daily (once in the morning, once at night) for 5 days.    The number of tablets to take varies by patient.    Don't chew or break capsules. Swallow whole.  When to take  Take as soon as possible after positive COVID-19 test result, and within 5 days of your first symptoms.  Who can take it  Patients must be 12 years or older, weigh at least 88 pounds, and have tested positive for COVID-19. This is the preferred treatment for pregnant patients.  Possible side effects  Can cause altered sense of taste, diarrhea (loose, watery stools), high blood pressure, muscle aches.  Medicine conflicts    Some medicines may conflict with Paxlovid and may cause serious side effects.    Tell your care team about all the medicines you take, including prescription and over-the-counter medicines, vitamins and herbal supplements.    Your provider will review your medicines to make sure that you can safely take Paxlovid.  Cautions    Paxlovid is not advised for patients with severe kidney or liver disease. If you have kidney or liver problems, the dose may need to be  changed.    If you are pregnant or breastfeeding, talk to your care team about your options.    If you are sexually active, use trusted birth control while taking Paxlovid.  Molnupiravir  How it works  Stops the virus from growing. Less amount of virus is easier for your body to fight.  Benefits  Lowers risk of a hospital stay or death from COVID-19.  How to take  Take 4 capsules by mouth every 12 hours (4 in the morning and 4 at night) for 5 days. Don't chew or break capsules. Swallow whole.  When to take  Take as soon as possible after positive COVID-19 test result, and within 5 days of your first symptoms.  Who can take it  Patients must be 18 years or older and have tested positive for COVID-19.  Possible side effects  Diarrhea (loose, watery stools), nausea (feeling sick to your stomach), dizziness, headaches.  Medicine conflicts  Right now, there are no known conflicts with other drugs. But tell your care team all medicines you take.  Cautions    This is not advised for patients who are pregnant.    Patients who could become pregnant should use trusted birth control until 4 days after their last dose.    Sexually active people of any gender should use trusted birth control for 3 months after their last dose.  Monoclonal antibodies  How it works  Monoclonal antibodies can detect pieces of the COVID virus and stop it from infecting your cells.  Benefits  Lowers risk of a hospital stay or death from COVID-19. Monoclonal antibodies are known to work well against the omicron variant.  How it is given to you  You will receive the treatment either by an infusion through your vein (IV) or shots.  When to take  Get as soon as possible after you test positive for COVID-19, and within 7 days of your first symptoms.  Who can take it  Patients must be 12 years or older, weigh at least 88 pounds and have tested positive for COVID-19.  Possible side effects  Fever, chills, diarrhea (loose, watery stools), dizziness,  itchiness and rash.  More serious side effects include: fever, difficulty breathing, low oxygen level in your blood, chills, tiredness, fast or slow heart rate, chest discomfort or pain, weakness, confusion, nausea, headache, shortness of breath, low or high blood pressure, wheezing, swelling of your lips, face, or throat, rash including hives, itching, muscle aches, dizziness, feeling faint and sweating.  If you receive an IV, you may have brief pain, bleeding and bruising of the skin, soreness, swelling and possible infection at the place where you get the IV needle.  Medicine conflicts  Please tell you care team other medicines you take so they can assess if there are any conflicts.  Cautions  Your doctor will talk with you about risks and benefits of this treatment and will help choose the best option for you.  For informational purposes only. Not to replace the advice of your health care provider.  Copyright   2022 [facility]. All rights reserved. Clinically reviewed by Lise Cardozo. [employer] [id number] - 08/22.    Instructions for Patients      What are the symptoms of COVID-19?  Symptoms can include fever, cough, shortness of breath, chills, headache, muscle pain sore throat, fatigue, runny or stuffy nose, and loss of taste and smell. Other less common symptoms include nausea, vomiting, or diarrhea (watery stools).    Know when to call 911. Emergency warning signs include:    Trouble breathing or shortness of breath    Pain or pressure in the chest that doesn't go away    Feeling confused like you haven't felt before, or not being able to wake up    Bluish-colored lips or face    How can I take care of myself?  1. Get lots of rest. Drink extra fluids (unless a doctor has told you not to).  2. Take Tylenol (acetaminophen) for fever or pain. If you have liver or kidney problems, ask your family doctor if it's okay to take Tylenol   Adults:   650 mg (two 325 mg pills or tablets) every 4 to 6 hours,  or...   1,000 mg (two 500 mg pills or tablets) every 8 hours as needed.  Note: Don't take more than 3,000 mg in one day. Acetaminophen is found in many medicines (both prescribed and over the counter). Read all labels to be sure you don't take too much.  For children, check the Tylenol bottle for the right dose. The dose is based on the child's age or weight.  3. Take over the counter medicines for your symptoms as needed. Talk to your pharmacist.  4. If you have other health problems (like cancer, heart failure, an organ transplant, or severe kidney disease): Call your specialty clinic if you don't feel better in the next 2 days.    Where can I get more information?     "[employer]view COVID-19 Resource Hub: www.[employer].org/covid19/     CDC Quarantine & Isolation: https://www.cdc.gov/coronavirus/2019-ncov/your-health/quarantine-isolation.html     CDC - What to Do If You're Sick: https://www.cdc.gov/coronavirus/2019-ncov/if-you-are-sick/index.html    [facility] clinical trials (COVID-19 research studies): clinicalaffairs.[facility]/umn-clinical-trials    [place] Department of Health COVID-19 Public Hotline: 1-902.321.1144  Coping with Life After COVID-19  Being in the hospital because of COVID-19 is scary. Going home can be scary, too. You may face changes to your life, the way you work or what you can eat. It s hard to adjust to change, and it s normal to feel afraid, frustrated or even angry. These feelings usually go away over time. If your feelings don t start to get better, it s called  adjustment disorder.      What signs should I look out for?  Adjustment disorder can happen to anyone in a time of stress. It makes it hard to cope with daily life. You may feel lonely or fight with loved ones, even if you re glad to be home. Watch for these signs:  Fear or worry  Hard time focusing  Sadness or anger  Trouble talking to family or friends  Feeling like you don t fit in or isolating  yourself  Problems with sleep   Drinking alcohol or taking drugs to cope    What can I do?  You can help yourself get better. Feeling you have control helps you move forward. You may wonder if you ll be able to do things you did before. Be patient. Do your best to make the most of every day. Try to build relationships, be as active as you can, eat right and keep a sense of humor. Avoid smoking and drinking too much alcohol. Call your family doctor or clinic if you re not sure what to do. They can guide you to care or other services.    When should I get help?  Think about getting counseling if your sadness or frustration gets worse. Together with a trained counselor, you can talk about your problems adjusting to life after your hospital stay. You can come up with new ways to handle changes that give you more control. Your family doctor or care team can help you find a counselor.     Get help if you re thinking about hurting yourself. If you need help right away, call 911 or go to the nearest emergency room. You can also try the Crisis Text Line.    Crisis Text Line: 407-098 (http://www.crisistextline.org)  The Crisis Text Line serves anyone, in any crisis. It gives free, 24/7 support. Here's how it works:  Text HOME to 056199 from anywhere in the [place], anytime, about any type of crisis.  A live, trained Crisis Counselor will text you back quickly.  The volunteer Crisis Counselor can help you move from a  hot moment  to a  cool moment.  They can also help you work out a safety plan.   Bill For Surgical Tray: no Expected Date Of Service: 05/31/2019 Billing Type: Third-Party Bill Performing Laboratory: [id number]

## 2022-11-28 ENCOUNTER — LAB (OUTPATIENT)
Dept: LAB | Facility: OTHER | Age: 72
End: 2022-11-28
Payer: COMMERCIAL

## 2022-11-28 DIAGNOSIS — C61 MALIGNANT NEOPLASM OF PROSTATE (H): ICD-10-CM

## 2022-11-28 LAB — PSA SERPL-MCNC: 3.48 NG/ML (ref 0–4.5)

## 2022-11-28 PROCEDURE — 84153 ASSAY OF PSA TOTAL: CPT | Mod: ZL

## 2022-11-28 PROCEDURE — 36415 COLL VENOUS BLD VENIPUNCTURE: CPT | Mod: ZL

## 2022-11-29 ENCOUNTER — OFFICE VISIT (OUTPATIENT)
Dept: SURGERY | Facility: OTHER | Age: 72
End: 2022-11-29
Attending: FAMILY MEDICINE
Payer: COMMERCIAL

## 2022-11-29 ENCOUNTER — TELEPHONE (OUTPATIENT)
Dept: FAMILY MEDICINE | Facility: OTHER | Age: 72
End: 2022-11-29

## 2022-11-29 VITALS
BODY MASS INDEX: 22.99 KG/M2 | HEIGHT: 65 IN | DIASTOLIC BLOOD PRESSURE: 72 MMHG | HEART RATE: 67 BPM | SYSTOLIC BLOOD PRESSURE: 122 MMHG | OXYGEN SATURATION: 98 % | WEIGHT: 138 LBS

## 2022-11-29 DIAGNOSIS — Z12.11 COLON CANCER SCREENING: Primary | ICD-10-CM

## 2022-11-29 DIAGNOSIS — Z12.11 ENCOUNTER FOR SCREENING COLONOSCOPY: Primary | ICD-10-CM

## 2022-11-29 PROCEDURE — 99203 OFFICE O/P NEW LOW 30 MIN: CPT | Performed by: NURSE PRACTITIONER

## 2022-11-29 RX ORDER — BISACODYL 5 MG/1
TABLET, DELAYED RELEASE ORAL
Qty: 4 TABLET | Refills: 0 | Status: SHIPPED | OUTPATIENT
Start: 2022-11-29 | End: 2023-04-15

## 2022-11-29 RX ORDER — BISACODYL 5 MG
10 TABLET, DELAYED RELEASE (ENTERIC COATED) ORAL ONCE
Qty: 2 TABLET | Refills: 0 | Status: SHIPPED | OUTPATIENT
Start: 2022-11-29 | End: 2022-11-29

## 2022-11-29 ASSESSMENT — PAIN SCALES - GENERAL: PAINLEVEL: NO PAIN (0)

## 2022-11-29 NOTE — PROGRESS NOTES
CLINIC NOTE - CONSULT  11/29/2022    Patient:Augie Hancock    Reason for Referral: Screening colonoscopy    This is a 71 year old male with a need of a colonoscopy for Screening colonoscopy.     Personal history of colon cancer : NO   Family history of colon cancer : NO   Personal history of polyps : NO   Family history of polyps : NO   History of Inflammatory Bowel Disease : NO   History of rectal bleeding : NO   Changes in bowel habits : NO   Last colonoscopy : 2012    Past Medical History:  Past Medical History:   Diagnosis Date     Femoral fracture 4/18/2011     OA (osteoarthritis) of knee 4/18/2011     Routine general medical examination at a [facility] facility 5/7/2007     Tibia/fibula fracture 4/18/2011       Past Surgical History:  Past Surgical History:   Procedure Laterality Date     COLONOSCOPY  12/17/2012    Repeat in 10 years     COLONOSCOPY  2004     JOINT REPLACEMENT  4/2011     leg fracture repair secondary to trauma      bilateral     RELEASE CARPAL TUNNEL Right 4/7/2016    Procedure: RELEASE CARPAL TUNNEL;  Surgeon: Moise Izaguirre MD;  Location: [facility] OR       Family History History:  Family History   Problem Relation Age of Onset     Cerebrovascular Disease Father      Heart Disease Father 70        MI; cause of death     Coronary Artery Disease Other      Diabetes Brother         type 2     Diabetes Mother         type 2     Diabetes Sister         type 2     Coronary Artery Disease Sister 64        MI       History of Tobacco Use:  History   Smoking Status     Never   Smokeless Tobacco     Current     Types: Chew       Current Medications:  Current Outpatient Medications   Medication Sig Dispense Refill     Ascorbic Acid (VITAMIN C) 500 MG CAPS Take 1 tablet by mouth daily       ASPIRIN PO Take 81 mg by mouth daily       GLUCOSAMINE-CHONDROIT-MSM-C-MN PO Take 2 tablets by mouth daily [id number]  Take one by mouth daily       nicotine (COMMIT) 2 MG lozenge Place 1  "lozenge (2 mg) inside cheek every hour as needed for smoking cessation 168 lozenge 3     nicotine (NICORETTE) 4 MG lozenge Place 1 lozenge (4 mg) inside cheek every hour as needed for smoking cessation 168 lozenge 3     UNABLE TO FIND MEDICATION NAME: Turkey tail 2 tabs daily       vitamin D3 (CHOLECALCIFEROL) 2000 units (50 mcg) tablet Take 1 tablet by mouth daily         Allergies:  No Known Allergies    ROS:  Constitutional: negative  Eyes: negative  Ears, nose, mouth, throat, and face: negative  Respiratory: negative  Cardiovascular: negative  Gastrointestinal: negative  Genitourinary: positive history of prostate cancer  Integument/breast: negative  Hematologic/lymphatic: negative  Musculoskeletal:negative  Neurological: negative  Behavioral/Psych: positive for history of tobacco use  Endocrine: negative  Allergic/Immunologic: negative    PHYSICAL EXAM:     Vital signs: /72 (BP Location: Left arm, Patient Position: Chair, Cuff Size: Adult Regular)   Pulse 67   Ht 1.651 m (5' 5\")   Wt 62.6 kg (138 lb)   SpO2 98%   BMI 22.96 kg/m     BMI: Body mass index is 22.96 kg/m .   General: Normal, healthy, cooperative, in no acute distress, alert   Skin: no rashes   Lungs: clear to auscultation   CV: Regular rate and rhythm   Abdominal: non-distended, soft, non-tender to palpation   Extremities: No cyanosis, clubbing or edema noted bilaterally in Upper and Lower Extremities   Neurological: without deficit    ASSESSMENT:      71 year old male with a need of a colonoscopy for Screening colonoscopy.    PLAN:   A colonoscopy will be scheduled.  The procedure with their risks, benefits and alternatives were explained.  Risks include but are not limited to bleeding, perforation, missing lesions, need for additional procedures, reaction to anesthesia.  All the patients questions were answered.  The patient consents to proceed as planned.      "

## 2022-11-29 NOTE — TELEPHONE ENCOUNTER
Screening Questions for the Scheduling of Screening Colonoscopies     (If Colonoscopy is diagnostic, Provider should review the chart before scheduling.)    Are you younger than 50 or older than 80?  Non    Do you take aspirin or fish oil?  No (if yes, tell patient to stop 1 week prior to Colonoscopy)    Do you take warfarin (Coumadin), clopidogrel (Plavix), apixaban (Eliquis), dabigatram (Pradaxa), rivaroxaban (Xarelto) or any blood thinner? No    Do you use oxygen at home?  No    Do you have kidney disease? No    Are you on dialysis? No    Have you had a stroke or heart attack in the last year? No    Have you had a stent in your heart or any blood vessel in the last year? No    Have you had a transplant of any organ?  No    Have you had a colonoscopy or upper endoscopy (EGD) before?  YES. Date-.2011         Date of scheduled Colonoscopy: 12/15/22     Provider: Dr Monzon     Ph[facility]acy [facility]

## 2022-11-29 NOTE — PATIENT INSTRUCTIONS
Thank you for allowing Ashley Mendoza CNP and our surgical team to participate in your care. Please call our health unit coordinator at [phone] with scheduling questions or the nurse at [phone] with any other questions or concerns.

## 2022-12-08 ENCOUNTER — ANESTHESIA EVENT (OUTPATIENT)
Dept: SURGERY | Facility: HOSPITAL | Age: 72
End: 2022-12-08
Payer: COMMERCIAL

## 2022-12-08 NOTE — ANESTHESIA PREPROCEDURE EVALUATION
Anesthesia Pre-Procedure Evaluation    Patient: Augie Hancock   MRN: [id number] : 1950        Procedure : Procedure(s):  COLONOSCOPY          Past Medical History:   Diagnosis Date     Femoral fracture 2011     OA (osteoarthritis) of knee 2011     Routine general medical examination at a health care facility 2007     Tibia/fibula fracture 2011      Past Surgical History:   Procedure Laterality Date     COLONOSCOPY  2012    Repeat in 10 years     COLONOSCOPY  2004     JOINT REPLACEMENT  2011     leg fracture repair secondary to trauma      bilateral     RELEASE CARPAL TUNNEL Right 2016    Procedure: RELEASE CARPAL TUNNEL;  Surgeon: Moise Izaguirre MD;  Location: [facility]      No Known Allergies   Social History     Tobacco Use     Smoking status: Never     Smokeless tobacco: Current     Types: Chew     Tobacco comments:     no passive exposure   Substance Use Topics     Alcohol use: No     Alcohol/week: 0.0 standard drinks      Wt Readings from Last 1 Encounters:   22 62.6 kg (138 lb)        Anesthesia Evaluation   Pt has had prior anesthetic. Type: MAC and General.        ROS/MED HX  ENT/Pulmonary: Comment: Chewing tobacco      Neurologic:  - neg neurologic ROS     Cardiovascular:  - neg cardiovascular ROS     METS/Exercise Tolerance: >4 METS    Hematologic:  - neg hematologic  ROS     Musculoskeletal:   (+) arthritis,     GI/Hepatic:     (+) bowel prep,     Renal/Genitourinary:     (+) BPH,     Endo:  - neg endo ROS     Psychiatric/Substance Use:  - neg psychiatric ROS     Infectious Disease:  - neg infectious disease ROS     Malignancy: Comment: Low grade prostate cancer no treatment so far   (+) Malignancy, History of Prostate.Prostate CA Active status post.        Other:  - neg other ROS          Physical Exam    Airway        Mallampati: II   TM distance: > 3 FB   Neck ROM: full   Mouth opening: > 3 cm    Respiratory Devices and Support         Dental  no  notable dental history         Cardiovascular   cardiovascular exam normal          Pulmonary   pulmonary exam normal                OUTSIDE LABS:  CBC:   Lab Results   Component Value Date    WBC 5.7 12/01/2021    WBC 6.2 01/30/2020    HGB 14.4 12/01/2021    HGB 15.0 01/30/2020    HCT 41.5 12/01/2021    HCT 43.3 01/30/2020     12/01/2021     01/30/2020     BMP:   Lab Results   Component Value Date     10/25/2022     12/01/2021    POTASSIUM 4.3 10/25/2022    POTASSIUM 3.8 12/01/2021    CHLORIDE 102 10/25/2022    CHLORIDE 106 12/01/2021    CO2 25 10/25/2022    CO2 27 12/01/2021    BUN 21.5 10/25/2022    BUN 19 12/01/2021    CR 0.76 10/25/2022    CR 0.80 12/01/2021    GLC 84 10/25/2022    GLC 94 12/01/2021     COAGS: No results found for: PTT, INR, FIBR  POC: No results found for: BGM, HCG, HCGS  HEPATIC:   Lab Results   Component Value Date    ALBUMIN 4.1 10/25/2022    PROTTOTAL 6.7 10/25/2022    ALT 11 10/25/2022    AST 20 10/25/2022    ALKPHOS 51 10/25/2022    BILITOTAL 0.5 10/25/2022     OTHER:   Lab Results   Component Value Date    BETO 9.2 10/25/2022    MAG 2.0 04/19/2016    LIPASE 71 (L) 12/01/2021    TSH 4.03 (H) 01/30/2020    T4 0.92 01/30/2020    CRP <2.9 04/19/2016    SED 4 04/19/2016       Anesthesia Plan    ASA Status:  2   NPO Status:  NPO Appropriate    Anesthesia Type: MAC.     - Reason for MAC: straight local not clinically adequate   Induction: Propofol.           Consents    Anesthesia Plan(s) and associated risks, benefits, and realistic alternatives discussed. Questions answered and patient/representative(s) expressed understanding.     - Discussed: Risks, Benefits and Alternatives for BOTH SEDATION and the PROCEDURE were discussed     - Discussed with:  Patient      - Extended Intubation/Ventilatory Support Discussed: No.      - Patient is DNR/DNI Status: No    Use of blood products discussed: No .     Postoperative Care            Comments:    Other Comments: Risks and  benefits of MAC anesthetic discussed including dental damage, aspiration, loss of airway, conversion to general anesthetic, CV complications, MI, stroke, death. Pt wishes to proceed.  11-29-22            RACHEL Domingo CRNA

## 2022-12-15 ENCOUNTER — ANESTHESIA (OUTPATIENT)
Dept: SURGERY | Facility: HOSPITAL | Age: 72
End: 2022-12-15
Payer: COMMERCIAL

## 2022-12-15 ENCOUNTER — HOSPITAL ENCOUNTER (OUTPATIENT)
Facility: HOSPITAL | Age: 72
Discharge: HOME | End: 2022-12-15
Attending: SURGERY | Admitting: SURGERY
Payer: COMMERCIAL

## 2022-12-15 VITALS
BODY MASS INDEX: 23.29 KG/M2 | HEART RATE: 58 BPM | OXYGEN SATURATION: 99 % | RESPIRATION RATE: 16 BRPM | HEIGHT: 65 IN | SYSTOLIC BLOOD PRESSURE: 131 MMHG | WEIGHT: 139.8 LBS | TEMPERATURE: 96.8 F | DIASTOLIC BLOOD PRESSURE: 75 MMHG

## 2022-12-15 PROCEDURE — 370N000017 HC ANESTHESIA TECHNICAL FEE, PER MIN: Performed by: SURGERY

## 2022-12-15 PROCEDURE — 45378 DIAGNOSTIC COLONOSCOPY: CPT | Performed by: NURSE ANESTHETIST, CERTIFIED REGISTERED

## 2022-12-15 PROCEDURE — 710N000012 HC RECOVERY PHASE 2, PER MINUTE: Performed by: SURGERY

## 2022-12-15 PROCEDURE — 250N000009 HC RX 250: Performed by: NURSE ANESTHETIST, CERTIFIED REGISTERED

## 2022-12-15 PROCEDURE — 360N000075 HC SURGERY LEVEL 2, PER MIN: Performed by: SURGERY

## 2022-12-15 PROCEDURE — 258N000003 HC RX IP 258 OP 636: Performed by: NURSE ANESTHETIST, CERTIFIED REGISTERED

## 2022-12-15 PROCEDURE — 250N000011 HC RX IP 250 OP 636: Performed by: NURSE ANESTHETIST, CERTIFIED REGISTERED

## 2022-12-15 PROCEDURE — G0121 COLON CA SCRN NOT HI RSK IND: HCPCS | Performed by: SURGERY

## 2022-12-15 PROCEDURE — 999N000141 HC STATISTIC PRE-PROCEDURE NURSING ASSESSMENT: Performed by: SURGERY

## 2022-12-15 RX ORDER — HYDROMORPHONE HYDROCHLORIDE 1 MG/ML
0.2 INJECTION, SOLUTION INTRAMUSCULAR; INTRAVENOUS; SUBCUTANEOUS EVERY 5 MIN PRN
Status: DISCONTINUED | OUTPATIENT
Start: 2022-12-15 | End: 2022-12-15 | Stop reason: HOSPADM

## 2022-12-15 RX ORDER — NALOXONE HYDROCHLORIDE 0.4 MG/ML
0.4 INJECTION, SOLUTION INTRAMUSCULAR; INTRAVENOUS; SUBCUTANEOUS
Status: DISCONTINUED | OUTPATIENT
Start: 2022-12-15 | End: 2022-12-15

## 2022-12-15 RX ORDER — LIDOCAINE HYDROCHLORIDE 20 MG/ML
INJECTION, SOLUTION INFILTRATION; PERINEURAL PRN
Status: DISCONTINUED | OUTPATIENT
Start: 2022-12-15 | End: 2022-12-15

## 2022-12-15 RX ORDER — FENTANYL CITRATE 50 UG/ML
25 INJECTION, SOLUTION INTRAMUSCULAR; INTRAVENOUS EVERY 5 MIN PRN
Status: DISCONTINUED | OUTPATIENT
Start: 2022-12-15 | End: 2022-12-15 | Stop reason: HOSPADM

## 2022-12-15 RX ORDER — ONDANSETRON 4 MG/1
4 TABLET, ORALLY DISINTEGRATING ORAL EVERY 30 MIN PRN
Status: DISCONTINUED | OUTPATIENT
Start: 2022-12-15 | End: 2022-12-15 | Stop reason: HOSPADM

## 2022-12-15 RX ORDER — FENTANYL CITRATE 50 UG/ML
25 INJECTION, SOLUTION INTRAMUSCULAR; INTRAVENOUS
Status: DISCONTINUED | OUTPATIENT
Start: 2022-12-15 | End: 2022-12-15 | Stop reason: HOSPADM

## 2022-12-15 RX ORDER — NALOXONE HYDROCHLORIDE 0.4 MG/ML
0.4 INJECTION, SOLUTION INTRAMUSCULAR; INTRAVENOUS; SUBCUTANEOUS
Status: DISCONTINUED | OUTPATIENT
Start: 2022-12-15 | End: 2022-12-15 | Stop reason: HOSPADM

## 2022-12-15 RX ORDER — NALOXONE HYDROCHLORIDE 0.4 MG/ML
0.2 INJECTION, SOLUTION INTRAMUSCULAR; INTRAVENOUS; SUBCUTANEOUS
Status: DISCONTINUED | OUTPATIENT
Start: 2022-12-15 | End: 2022-12-15

## 2022-12-15 RX ORDER — SODIUM CHLORIDE, SODIUM LACTATE, POTASSIUM CHLORIDE, CALCIUM CHLORIDE 600; 310; 30; 20 MG/100ML; MG/100ML; MG/100ML; MG/100ML
INJECTION, SOLUTION INTRAVENOUS CONTINUOUS
Status: DISCONTINUED | OUTPATIENT
Start: 2022-12-15 | End: 2022-12-15 | Stop reason: HOSPADM

## 2022-12-15 RX ORDER — ONDANSETRON 2 MG/ML
4 INJECTION INTRAMUSCULAR; INTRAVENOUS EVERY 30 MIN PRN
Status: DISCONTINUED | OUTPATIENT
Start: 2022-12-15 | End: 2022-12-15 | Stop reason: HOSPADM

## 2022-12-15 RX ORDER — HYDROMORPHONE HYDROCHLORIDE 1 MG/ML
0.4 INJECTION, SOLUTION INTRAMUSCULAR; INTRAVENOUS; SUBCUTANEOUS EVERY 5 MIN PRN
Status: DISCONTINUED | OUTPATIENT
Start: 2022-12-15 | End: 2022-12-15 | Stop reason: HOSPADM

## 2022-12-15 RX ORDER — MEPERIDINE HYDROCHLORIDE 25 MG/ML
12.5 INJECTION INTRAMUSCULAR; INTRAVENOUS; SUBCUTANEOUS
Status: DISCONTINUED | OUTPATIENT
Start: 2022-12-15 | End: 2022-12-15 | Stop reason: HOSPADM

## 2022-12-15 RX ORDER — NALOXONE HYDROCHLORIDE 0.4 MG/ML
0.2 INJECTION, SOLUTION INTRAMUSCULAR; INTRAVENOUS; SUBCUTANEOUS
Status: DISCONTINUED | OUTPATIENT
Start: 2022-12-15 | End: 2022-12-15 | Stop reason: HOSPADM

## 2022-12-15 RX ORDER — HYDRALAZINE HYDROCHLORIDE 20 MG/ML
5-10 INJECTION INTRAMUSCULAR; INTRAVENOUS EVERY 10 MIN PRN
Status: DISCONTINUED | OUTPATIENT
Start: 2022-12-15 | End: 2022-12-15 | Stop reason: HOSPADM

## 2022-12-15 RX ORDER — FENTANYL CITRATE 50 UG/ML
50 INJECTION, SOLUTION INTRAMUSCULAR; INTRAVENOUS EVERY 5 MIN PRN
Status: DISCONTINUED | OUTPATIENT
Start: 2022-12-15 | End: 2022-12-15 | Stop reason: HOSPADM

## 2022-12-15 RX ORDER — HALOPERIDOL 5 MG/ML
0.5 INJECTION INTRAMUSCULAR
Status: DISCONTINUED | OUTPATIENT
Start: 2022-12-15 | End: 2022-12-15 | Stop reason: HOSPADM

## 2022-12-15 RX ORDER — PROPOFOL 10 MG/ML
INJECTION, EMULSION INTRAVENOUS PRN
Status: DISCONTINUED | OUTPATIENT
Start: 2022-12-15 | End: 2022-12-15

## 2022-12-15 RX ORDER — LIDOCAINE 40 MG/G
CREAM TOPICAL
Status: DISCONTINUED | OUTPATIENT
Start: 2022-12-15 | End: 2022-12-15 | Stop reason: HOSPADM

## 2022-12-15 RX ADMIN — LIDOCAINE HYDROCHLORIDE 40 MG: 20 INJECTION, SOLUTION INFILTRATION; PERINEURAL at 08:32

## 2022-12-15 RX ADMIN — PROPOFOL 50 MG: 10 INJECTION, EMULSION INTRAVENOUS at 08:45

## 2022-12-15 RX ADMIN — PROPOFOL 50 MG: 10 INJECTION, EMULSION INTRAVENOUS at 08:41

## 2022-12-15 RX ADMIN — PROPOFOL 50 MG: 10 INJECTION, EMULSION INTRAVENOUS at 08:36

## 2022-12-15 RX ADMIN — PROPOFOL 50 MG: 10 INJECTION, EMULSION INTRAVENOUS at 08:44

## 2022-12-15 RX ADMIN — PROPOFOL 50 MG: 10 INJECTION, EMULSION INTRAVENOUS at 08:33

## 2022-12-15 RX ADMIN — SODIUM CHLORIDE, POTASSIUM CHLORIDE, SODIUM LACTATE AND CALCIUM CHLORIDE: 600; 310; 30; 20 INJECTION, SOLUTION INTRAVENOUS at 07:34

## 2022-12-15 ASSESSMENT — ACTIVITIES OF DAILY LIVING (ADL)
ADLS_ACUITY_SCORE: 35
ADLS_ACUITY_SCORE: 33

## 2022-12-15 NOTE — OP NOTE
REPORT OF OPERATION  DATE OF PROCEDURE: 12/15/2022    PATIENT: Augie Hancock    SURGERY PERFORMED: Colonoscopy    PREOPERATIVE DIAGNOSIS: Screening colonoscopy    POSTOPERATIVE DIAGNOSIS:    Same   Normal colonoscopy   Diverticulosis was identified.   Hemorrhoids  were  identified.    SURGEON: Sony Monzon MD    ASSISTANTS: None    ANESTHESIA: Monitored Anesthesia Care    COMPLICATIONS: None apparent    TRANSFUSIONS: None    TISSUE TO PATHOLOGY: None    FINDINGS: Normal colonoscopy.  Diverticulosis was identified.  Hemorrhoids  were  identified.    INDICATIONS: This is a 72 year old male in need of a colonoscopy for Screening colonoscopy.  The patient will be taken to the endoscopy suite for that procedure.    DESCRIPTIONS OF PROCEDURE IN DETAIL: After consent was obtained the patient was taken to the endoscopy suite and placed in the left lateral decubitus position.  The patient was identified and the correct patient was confirmed.  Monitored Anesthesia Care was given.  A time out was performed verifying the correct patient and the correct procedure.  The entire operative team was in agreement.  All necessary equipment and supplies were in the room.    Rectal exam was performed and no lesions of the anal canal were noted.  The colonoscope was inserted into the anus and passed without difficulty to the cecum.  The cecum was identified by the ileocecal valve, the coalescence of the tinea and the appendiceal orifice.  Upon withdrawal all walls of the colon were visualized.  There were no polyps, masses or evidence of colitis seen.  Diverticulosis was seen.  Upon reaching the rectum the scope was retroflexed and internal hemorrhoids  were  seen.  The scope was straightened back out and removed from the patient.  The patient was then taken to the recovery room in stable condition tolerating the procedure well.      Prep: fair    Withdrawal time was 6 minutes.    It is recommended that the patient have another  colonoscopy PRN.

## 2022-12-15 NOTE — H&P
"HISTORY AND PHYSICAL - ENDOSCOPY   12/15/2022    Patient : Augie Hancock    Planned Procedures : Colonoscopy    This is a 72 year old male with a need for a colonoscopy for Screening colonoscopy.      Last colonoscopy : 10 years  Family history of colon cancer : NO  Family history of colon polyps : NO  Personal history of colon cancer : NO  Personal history of colon polyps : NO  Rectal bleeding : NO  Changes in bowel habits :NO  Personal history of inflammatory bowel disease : NO    Past Medical History:  Past Medical History:   Diagnosis Date     Femoral fracture 4/18/2011     OA (osteoarthritis) of knee 4/18/2011     Routine general medical examination at a [facility] care facility 5/7/2007     Tibia/fibula fracture 4/18/2011       Past Surgical History:  Past Surgical History:   Procedure Laterality Date     COLONOSCOPY  12/17/2012    Repeat in 10 years     COLONOSCOPY  2004     JOINT REPLACEMENT  4/2011     leg fracture repair secondary to trauma      bilateral     RELEASE CARPAL TUNNEL Right 4/7/2016    Procedure: RELEASE CARPAL TUNNEL;  Surgeon: Moise Izaguirre MD;  Location: [facility]       Family History History:  Family History   Problem Relation Age of Onset     Cerebrovascular Disease Father      Heart Disease Father 70        MI; cause of death     Coronary Artery Disease Other      Diabetes Brother         type 2     Diabetes Mother         type 2     Diabetes Sister         type 2     Coronary Artery Disease Sister 64        MI       History of Tobacco Use:  History   Smoking Status     Never   Smokeless Tobacco     Current     Types: Chew       Current Medications:  No current outpatient medications on file.       Allergies:  No Known Allergies    ROS:  Pertinent items are noted in HPI.  All other systems are negative.    PHYSICAL EXAM:     Vital signs: /70 (Cuff Size: Adult Regular)   Pulse 62   Temp 97.5  F (36.4  C) (Tympanic)   Resp 16   Ht 1.651 m (5' 5\")   Wt 63.4 kg (139 lb 12.8 oz)  "  SpO2 97%   BMI 23.26 kg/m     Weight: [unfilled]   BMI: Body mass index is 23.26 kg/m .   General: Normal, healthy, cooperative, in no acute distress, alert   Skin: no jaundice   HEENT: PERRLA and EOMI   Neck: supple   Lungs: clear to auscultation   CV: Regular rate and rhythm without murmer   Abdominal: abdomen is soft without significant tenderness, masses, organomegaly or guarding   Extremities: No cyanosis, clubbing or edema noted bilaterally in Upper and Lower Extremities   Neurological: without deficit    Assessment:   72 year old male with need of a colonoscopy for Screening colonoscopy:    Plan:   Will plan on doing a colonoscopy.      The risks, benefits, and alternatives to the planned procedure were fully discussed with the patient and/or the patient's representative(s). The risks of bleeding, infection, death, missing pathology, the need for additional procedures intra-operatively, the possible need for intra-operative consults, the possible need for transfusion therapy, cardiopulmonary compromise, the possible need for additional surgery for a complication were discussed with the patient and/or the patient's representative(s). The patient's and/or patient's representative(s) questions were addressed and answered. Informed consent was obtained from the patient and/or the patient's representative(s). The patient and/or the patient's representative(s) consent to proceed.    Specific risks:  Risks include but are not limited to bleeding, perforation, missing lesions, need for additional procedures, reaction to anesthesia.  All the patients questions were answered.  The patient consents to proceed.  The procedures will be scheduled.

## 2022-12-15 NOTE — ANESTHESIA POSTPROCEDURE EVALUATION
Patient: Augie Hancock    Procedure: Procedure(s):  COLONOSCOPY       Anesthesia Type:  MAC    Note:  Disposition: Outpatient   Postop Pain Control: Uneventful            Sign Out: Well controlled pain   PONV: No   Neuro/Psych: Uneventful            Sign Out: Acceptable/Baseline neuro status   Airway/Respiratory: Uneventful            Sign Out: Acceptable/Baseline resp. status   CV/Hemodynamics: Uneventful            Sign Out: Acceptable CV status; No obvious hypovolemia; No obvious fluid overload   Other NRE: NONE   DID A NON-ROUTINE EVENT OCCUR? No           Last vitals:  Vitals Value Taken Time   /64 12/15/22 0910   Temp 97  F (36.1  C) 12/15/22 0854   Pulse 55 12/15/22 0910   Resp 18 12/15/22 0905   SpO2 98 % 12/15/22 0910   Vitals shown include unvalidated device data.    Electronically Signed By: RACHEL Meadows CRNA  December 15, 2022  9:12 AM

## 2022-12-15 NOTE — DISCHARGE INSTRUCTIONS

## 2022-12-15 NOTE — ANESTHESIA CARE TRANSFER NOTE
Patient: Augie Hancock    Procedure: Procedure(s):  COLONOSCOPY       Diagnosis: Encounter for screening colonoscopy [Z12.11]  Diagnosis Additional Information: No value filed.    Anesthesia Type:   MAC     Note:    Oropharynx: oropharynx clear of all foreign objects and spontaneously breathing  Level of Consciousness: drowsy and awake  Oxygen Supplementation: room air    Independent Airway: airway patency satisfactory and stable    Vital Signs Stable: post-procedure vital signs reviewed and stable  Report to RN Given: handoff report given  Patient transferred to: Phase II    Handoff Report: Identifed the Patient, Identified the Reponsible Provider, Reviewed the pertinent medical history, Discussed the surgical course, Reviewed Intra-OP anesthesia mangement and issues during anesthesia, Set expectations for post-procedure period and Allowed opportunity for questions and acknowledgement of understanding      Vitals:  Vitals Value Taken Time   BP     Temp     Pulse     Resp     SpO2 93 % 12/15/22 0855   Vitals shown include unvalidated device data.    Electronically Signed By: RACHEL Ramachandran CRNA  December 15, 2022  8:56 AM

## 2022-12-15 NOTE — OR NURSING
Patient and responsible adult given discharge instructions with no questions regarding instructions. Nicolette score 18/18. Pain level 0/10.  Discharged from unit via ambulatory. Patient discharged to home.

## 2023-01-17 ENCOUNTER — OFFICE VISIT (OUTPATIENT)
Dept: AUDIOLOGY | Facility: OTHER | Age: 73
End: 2023-01-17
Attending: AUDIOLOGIST
Payer: COMMERCIAL

## 2023-01-17 DIAGNOSIS — H90.3 SENSORINEURAL HEARING LOSS (SNHL) OF BOTH EARS: Primary | ICD-10-CM

## 2023-01-17 PROCEDURE — 92591 HC HEARING AID EXAM BINAURAL: CPT | Performed by: AUDIOLOGIST

## 2023-01-17 NOTE — PROGRESS NOTES
BACKGROUND:  Augie was seen today for consult regarding hearing aids. The patient notes difficulty with communication in a variety of listening situations and would like to explore possible benefit obtained via use of amplification.      Patient has received medical clearance for hearing aid use from Dr. Cotton.  Augie is a binaural hearing aid candidate and will receive a Hearing Aid Recommendation today.    RESULTS:  Audiology Assistant reviewed below information:      Estimated insurance coverage for hearing aids reviewed.    [place] Department of Health form titled 'Legal rights and consumer information about purchasing a hearing instrument verbally reviewed and given to patient. Trial Period, cancellation fee, warranties highlighted. Patient risk factors have been provided to the patient in writing prior to the sale of the hearing aid per FDA regulation. The risk factors are also available in the User Instructional Booklet to be presented on the day of the hearing aid fitting.    ABN (Advanced Beneficiary Notice of Non-Coverage) completed and copy given to patient.       Hearing aid recommendation provided by Audiologist.    Thru use of hearing aids patient would like to improve ability to hear:    where there are groups and noise.     to hear the television at a lower volume to enjoy this activity with other people.     Augie also reports trouble hearing in the car, at home, and on the telephone if there is not a volume control.      Discussed hearing aid styles, technology, features, and options at length with Augie.  Sample devices were shown. Pros/Cons of options reviewed.  Expectations for amplification discussed. .Also discussed the importance of binaural amplification for hearing speech in the presence of background noise and localizing the directions of sounds.  Wireless options were also discussed at length and sample devices were shown.       Hearing Aid Recommendations: Hearing Aid  Recommendation reviewed with patient. Pt chose to proceed with order and Purchase Agreement completed. Copies provided to patient.      Hearing Aids:  Binaural Oticon More 2 miniRITE R  Color: beige  Battery Size: rechargeable  Earmold/Tips: 2-85    RECOMMENDATIONS:  The 45 day trial period was explained to patient, and they expressed understanding. Mr. Hancock signed the Hearing Aid Purchase Agreement and was given a copy, as well as details on his hearing aids. Patient was counseled that exact out of pocket amounts cannot be determined for hearing aid claims being sent to insurance. Any insurance coverage information presented to the patient is an estimate only, and is not a guarantee of payment. Patient has been advised to check with their own insurance.      Patient scheduled to return to clinic in 2 weeks for hearing aid fitting, programming and orientation.    Danielle Moreno M.S.,[facility]  Audiologist #[id number]

## 2023-04-15 ENCOUNTER — HOSPITAL ENCOUNTER (EMERGENCY)
Facility: HOSPITAL | Age: 73
Discharge: HOME | End: 2023-04-15
Attending: PHYSICIAN ASSISTANT | Admitting: PHYSICIAN ASSISTANT
Payer: COMMERCIAL

## 2023-04-15 VITALS
OXYGEN SATURATION: 98 % | BODY MASS INDEX: 23.11 KG/M2 | RESPIRATION RATE: 18 BRPM | SYSTOLIC BLOOD PRESSURE: 127 MMHG | HEART RATE: 64 BPM | DIASTOLIC BLOOD PRESSURE: 71 MMHG | WEIGHT: 138.89 LBS | TEMPERATURE: 97.1 F

## 2023-04-15 DIAGNOSIS — J06.9 UPPER RESPIRATORY TRACT INFECTION, UNSPECIFIED TYPE: ICD-10-CM

## 2023-04-15 PROCEDURE — 87637 SARSCOV2&INF A&B&RSV AMP PRB: CPT | Performed by: PHYSICIAN ASSISTANT

## 2023-04-15 PROCEDURE — G0463 HOSPITAL OUTPT CLINIC VISIT: HCPCS

## 2023-04-15 PROCEDURE — C9803 HOPD COVID-19 SPEC COLLECT: HCPCS

## 2023-04-15 PROCEDURE — 99213 OFFICE O/P EST LOW 20 MIN: CPT | Mod: CS | Performed by: PHYSICIAN ASSISTANT

## 2023-04-15 RX ORDER — ALBUTEROL SULFATE 90 UG/1
2 AEROSOL, METERED RESPIRATORY (INHALATION) EVERY 4 HOURS PRN
Qty: 18 G | Refills: 0 | Status: SHIPPED | OUTPATIENT
Start: 2023-04-15

## 2023-04-15 RX ORDER — AZITHROMYCIN 250 MG/1
TABLET, FILM COATED ORAL
Qty: 6 TABLET | Refills: 0 | Status: SHIPPED | OUTPATIENT
Start: 2023-04-15 | End: 2023-04-20

## 2023-04-15 RX ORDER — BENZONATATE 100 MG/1
100 CAPSULE ORAL 3 TIMES DAILY PRN
Qty: 30 CAPSULE | Refills: 0 | Status: SHIPPED | OUTPATIENT
Start: 2023-04-15 | End: 2023-04-25

## 2023-04-15 ASSESSMENT — ENCOUNTER SYMPTOMS
COUGH: 1
DIARRHEA: 0
ARTHRALGIAS: 0
SINUS PRESSURE: 0
SORE THROAT: 0
NAUSEA: 0
CHILLS: 0
FEVER: 0
VOMITING: 0
WHEEZING: 0
HEADACHES: 0

## 2023-04-15 NOTE — DISCHARGE INSTRUCTIONS
COVID/flu/RSV results are pending.  We will notify you of results once received.  If negative, I will send an antibiotic for you to pharmacy.    Albuterol inhaler, 2 puffs every 4 hours as needed for shortness of breath, wheezing, or cough.  Recommend using this before bed at night.    Tessalon Perles, 1 capsule 3 times daily as needed for cough suppression.    Recommend Delsym cough syrup, this is over-the-counter.    Recommend Mucinex, without the D component, for congestion.  This is also over-the-counter.    Increase fluids, rest.    Follow-up with your primary provider with any persistent symptoms.    You should return to the emergency department or urgent care with any severe, worsening symptoms, or other concerns.

## 2023-04-15 NOTE — ED TRIAGE NOTES
Patient presents to urgent care for cough for 2 weeks or so. Patient would like to be checked for COVID, RSV, FLU and pneumonia. Patient has some congestion.

## 2023-04-15 NOTE — ED PROVIDER NOTES
History     Chief Complaint   Patient presents with     Cough     HPI  Augie Hancock is a 72 year old male who presents to the urgent care today for evaluation of upper respiratory symptoms.  Onset of symptoms 2 weeks ago.  Patient endorses cough, congestion since that time.  Cough is nonproductive, waking him at night.  Denies shortness of breath, wheezing, fatigue, body aches, nausea/vomiting, fever, chills, sweats, ill contacts or other concerns.  Is eating and drinking normally.  No aggravating or alleviating factors.  Has been using OTC treatments without significant relief.        Allergies:  No Known Allergies    Problem List:    Patient Active Problem List    Diagnosis Date Noted     Prostate cancer (H) 01/30/2020     Priority: Medium     Advanced directives, counseling/discussion 09/21/2016     Priority: Medium     Advance Care Planning 9/21/2016: ACP Review of Chart / Resources Provided:  Reviewed chart for advance care plan.  Augie Hancock has no plan or code status on file. Discussed available resources and provided with information. Pt states he has paperwork at home.  Added by Shena Singletary             ACP (advance care planning) 09/15/2016     Priority: Medium     Advance Care Planning 9/15/2016: ACP Review of Chart / Resources Provided:  Reviewed chart for advance care plan.  Augie Hancock has been provided information and resources to begin or update their advance care plan.  Added by Pilar Carbone             Prostate nodule 09/15/2016     Priority: Medium     Astigmatism 07/14/2011     Priority: Medium     Formatting of this note might be different from the original.  IMO Update       Osteoarthrosis involving lower leg 05/06/2011     Priority: Medium     Overview:   IMO Update  IMO Update 10 2016  Replacing diagnoses that were inactivated after the 10/1/2021 regulatory import.          Past Medical History:    Past Medical History:   Diagnosis Date     Femoral fracture  4/18/2011     OA (osteoarthritis) of knee 4/18/2011     Routine general medical examination at a health care facility 5/7/2007     Tibia/fibula fracture 4/18/2011       Past Surgical History:    Past Surgical History:   Procedure Laterality Date     COLONOSCOPY  12/17/2012    Repeat in 10 years     COLONOSCOPY  2004     COLONOSCOPY N/A 12/15/2022    Procedure: COLONOSCOPY;  Surgeon: Sony Monzon MD;  Location: [facility]     JOINT REPLACEMENT  4/2011     leg fracture repair secondary to trauma      bilateral     RELEASE CARPAL TUNNEL Right 4/7/2016    Procedure: RELEASE CARPAL TUNNEL;  Surgeon: Moise Izaguirre MD;  Location: [facility]       Family History:    Family History   Problem Relation Age of Onset     Cerebrovascular Disease Father      Heart Disease Father 70        MI; cause of death     Coronary Artery Disease Other      Diabetes Brother         type 2     Diabetes Mother         type 2     Diabetes Sister         type 2     Coronary Artery Disease Sister 64        MI       Social History:  Marital Status:   [2]  Social History     Tobacco Use     Smoking status: Never     Smokeless tobacco: Current     Types: Chew     Tobacco comments:     no passive exposure   Vaping Use     Vaping status: Never Used   Substance Use Topics     Alcohol use: No     Alcohol/week: 0.0 standard drinks of alcohol        Medications:    albuterol (PROAIR HFA/PROVENTIL HFA/VENTOLIN HFA) 108 (90 Base) MCG/ACT inhaler  Ascorbic Acid (VITAMIN C) 500 MG CAPS  ASPIRIN PO  benzonatate (TESSALON) 100 MG capsule  GLUCOSAMINE-CHONDROIT-MSM-C-MN PO  nicotine (COMMIT) 2 MG lozenge  nicotine (NICORETTE) 4 MG lozenge  UNABLE TO FIND  vitamin D3 (CHOLECALCIFEROL) 2000 units (50 mcg) tablet          Review of Systems   Constitutional: Negative for chills and fever.   HENT: Positive for congestion. Negative for sinus pressure and sore throat.    Respiratory: Positive for cough. Negative for wheezing.    Cardiovascular: Negative for  chest pain.   Gastrointestinal: Negative for diarrhea, nausea and vomiting.   Musculoskeletal: Negative for arthralgias.   Neurological: Negative for headaches.   All other systems reviewed and are negative.      Physical Exam   BP: 127/71  Pulse: 64  Temp: 97.1  F (36.2  C)  Resp: 18  Weight: 63 kg (138 lb 14.2 oz)  SpO2: 98 %      Physical Exam  Vitals and nursing note reviewed.   Constitutional:       General: He is not in acute distress.     Appearance: Normal appearance. He is well-developed and normal weight.   HENT:      Head: Normocephalic and atraumatic.      Right Ear: Tympanic membrane and ear canal normal. Tympanic membrane is not erythematous.      Left Ear: Tympanic membrane and ear canal normal. Tympanic membrane is not erythematous.      Nose: Nose normal.      Mouth/Throat:      Mouth: Mucous membranes are moist.      Pharynx: Uvula midline. No oropharyngeal exudate or posterior oropharyngeal erythema.      Tonsils: No tonsillar exudate.   Eyes:      General:         Right eye: No discharge.         Left eye: No discharge.      Conjunctiva/sclera: Conjunctivae normal.   Cardiovascular:      Rate and Rhythm: Normal rate and regular rhythm.      Heart sounds: Normal heart sounds. No murmur heard.  Pulmonary:      Effort: Pulmonary effort is normal.      Breath sounds: Normal breath sounds. No wheezing, rhonchi or rales.   Musculoskeletal:      Cervical back: Normal range of motion.   Lymphadenopathy:      Cervical: No cervical adenopathy.   Skin:     General: Skin is warm and dry.      Findings: No rash.   Neurological:      General: No focal deficit present.      Mental Status: He is alert.   Psychiatric:         Mood and Affect: Mood normal.         Behavior: Behavior normal.         ED Course                 Procedures             Critical Care time:               No results found for this or any previous visit (from the past 24 hour(s)).    Medications - No data to display    Assessments & Plan  (with Medical Decision Making)   72-year-old male presented to the urgent care today for evaluation of nonproductive cough, congestion for the past 2 weeks.  Patient has been afebrile.  Eating and drinking normally.  Vital signs were within normal limits.  Physical examination was benign today.  COVID/flu/RSV test obtained, results pending.  If negative, will prescribe azithromycin to cover atypical organisms, aced on symptoms and length of illness.  In the interim, Ventolin inhaler and Tessalon Perles were prescribed.  Discussed OTC treatments, increase fluids, rest, signs and symptoms of worsening and when to return.  Patient verbalized understanding and is in agreement with plan.  He will follow-up with his primary provider with any persistent symptoms.    I have reviewed the nursing notes.    I have reviewed the findings, diagnosis, plan and need for follow up with the patient.          Medical Decision Making  The patient's presentation was of low complexity (an acute and uncomplicated illness or injury).    The patient's evaluation involved:  ordering and/or review of 1 test(s) in this encounter (see separate area of note for details)    The patient's management necessitated moderate risk (prescription drug management including medications given in the ED).        New Prescriptions    ALBUTEROL (PROAIR HFA/PROVENTIL HFA/VENTOLIN HFA) 108 (90 BASE) MCG/ACT INHALER    Inhale 2 puffs into the lungs every 4 hours as needed for shortness of breath, wheezing or cough    BENZONATATE (TESSALON) 100 MG CAPSULE    Take 1 capsule (100 mg) by mouth 3 times daily as needed for cough       Final diagnoses:   Upper respiratory tract infection, unspecified type       4/15/2023   [facility]     Abbie Lo PA-C  04/15/23 [id number]

## 2023-05-31 ENCOUNTER — HOSPITAL ENCOUNTER (EMERGENCY)
Facility: HOSPITAL | Age: 73
Discharge: HOME | End: 2023-05-31
Attending: EMERGENCY MEDICINE | Admitting: EMERGENCY MEDICINE
Payer: COMMERCIAL

## 2023-05-31 VITALS
DIASTOLIC BLOOD PRESSURE: 95 MMHG | HEART RATE: 61 BPM | TEMPERATURE: 96.9 F | SYSTOLIC BLOOD PRESSURE: 141 MMHG | OXYGEN SATURATION: 93 % | RESPIRATION RATE: 18 BRPM

## 2023-05-31 DIAGNOSIS — R13.10 DYSPHAGIA, UNSPECIFIED TYPE: ICD-10-CM

## 2023-05-31 PROCEDURE — 99282 EMERGENCY DEPT VISIT SF MDM: CPT

## 2023-05-31 PROCEDURE — 99283 EMERGENCY DEPT VISIT LOW MDM: CPT | Performed by: EMERGENCY MEDICINE

## 2023-05-31 ASSESSMENT — ACTIVITIES OF DAILY LIVING (ADL): ADLS_ACUITY_SCORE: 35

## 2023-05-31 NOTE — ED NOTES
Discharge instructions reviewed. Verbalized understanding. Denies pain. Denies questions or concerns. Ambulated out of ER independently with steady gait.

## 2023-05-31 NOTE — ED NOTES
"Patient drank 8oz of water without difficulty swallowing. He denies pain. He states the \"metal\" taste is lessening with each drink of water.  "

## 2023-05-31 NOTE — ED PROVIDER NOTES
History     Chief Complaint   Patient presents with     Dysphagia     HPI  Augie Hancock is a 72 year old male who who presents amatory through triage.  Reports this morning he woke up without difficulty and then took his morning medications but then after drinking coffee he felt like he could not swallow after he took his a.m. medications.  He reports he had abnormal taste in his mouth so he thought a pill might be stuck.  Taste did not go away and he presents for further evaluation.  Upon arrival he had a normal neurologic nation without any other symptoms such as headache confusion arm or leg weakness.  After he arrived in triage today, he drank fluids orally and his symptoms abated and the abnormal taste in his mouth is resolving.    Patient has no other complaints at this time.  Patient denies any history of progressive dysphagia.  Does report this is happened a couple times in the past.  He denies any fever, cough, shortness of breath, headache, arm or leg numbness ting or weakness.  After short ED stay as requested discharge when I enter the room.    Allergies:  No Known Allergies    Problem List:    Patient Active Problem List    Diagnosis Date Noted     Prostate cancer (H) 01/30/2020     Priority: Medium     Advanced directives, counseling/discussion 09/21/2016     Priority: Medium     Advance Care Planning 9/21/2016: ACP Review of Chart / Resources Provided:  Reviewed chart for advance care plan.  Augie Hancock has no plan or code status on file. Discussed available resources and provided with information. Pt states he has paperwork at home.  Added by Shena Singletary             ACP (advance care planning) 09/15/2016     Priority: Medium     Advance Care Planning 9/15/2016: ACP Review of Chart / Resources Provided:  Reviewed chart for advance care plan.  Augie Hancock has been provided information and resources to begin or update their advance care plan.  Added by Pilar Carbone              Prostate nodule 09/15/2016     Priority: Medium     Astigmatism 07/14/2011     Priority: Medium     Formatting of this note might be different from the original.  IMO Update       Osteoarthrosis involving lower leg 05/06/2011     Priority: Medium     Overview:   IMO Update  IMO Update 10 2016  Replacing diagnoses that were inactivated after the 10/1/2021 regulatory import.          Past Medical History:    Past Medical History:   Diagnosis Date     Femoral fracture 4/18/2011     OA (osteoarthritis) of knee 4/18/2011     Routine general medical examination at a health care facility 5/7/2007     Tibia/fibula fracture 4/18/2011       Past Surgical History:    Past Surgical History:   Procedure Laterality Date     COLONOSCOPY  12/17/2012    Repeat in 10 years     COLONOSCOPY  2004     COLONOSCOPY N/A 12/15/2022    Procedure: COLONOSCOPY;  Surgeon: Sony Monzon MD;  Location: [facility]     JOINT REPLACEMENT  4/2011     leg fracture repair secondary to trauma      bilateral     RELEASE CARPAL TUNNEL Right 4/7/2016    Procedure: RELEASE CARPAL TUNNEL;  Surgeon: Moise Izaguirre MD;  Location: [facility]       Family History:    Family History   Problem Relation Age of Onset     Cerebrovascular Disease Father      Heart Disease Father 70        MI; cause of death     Coronary Artery Disease Other      Diabetes Brother         type 2     Diabetes Mother         type 2     Diabetes Sister         type 2     Coronary Artery Disease Sister 64        MI       Social History:  Marital Status:   [2]  Social History     Tobacco Use     Smoking status: Never     Smokeless tobacco: Current     Types: Chew     Tobacco comments:     no passive exposure   Vaping Use     Vaping status: Never Used   Substance Use Topics     Alcohol use: No     Alcohol/week: 0.0 standard drinks of alcohol        Medications:    albuterol (PROAIR HFA/PROVENTIL HFA/VENTOLIN HFA) 108 (90 Base) MCG/ACT inhaler  Ascorbic Acid (VITAMIN C) 500 MG  CAPS  ASPIRIN PO  GLUCOSAMINE-CHONDROIT-MSM-C-MN PO  nicotine (COMMIT) 2 MG lozenge  nicotine (NICORETTE) 4 MG lozenge  UNABLE TO FIND  vitamin D3 (CHOLECALCIFEROL) 2000 units (50 mcg) tablet          Review of Systems  Per HPI o/w 10 neg  Physical Exam   BP: 137/74  Pulse: 64  Temp: 96.9  F (36.1  C)  Resp: 18  SpO2: 97 %      Physical Exam   CONSTITUTIONAL: Polite and conversant.  He is asking to be discharged.  Vitals: reviewed in epic  HEAD: normal inspection  EYES: Anicteric sclerae; no proptosis. CN 2-7 grossly intact  Ears: intact hearing to spoken voice  NECK: trachea midline.  No stridor.  No crepitus no mass.  Full range of motion.  :Mouth/neck. Normal oropharynx he was midline normal tongue.  Floor of mouth is soft.  No stridor no trismus no drooling.  Tolerating oral secretions well and drink water without difficulty  RESPIRATORY: Normal respiratory effort.  CARDIOVASCULAR: No peripheral edema. normal rate  ABD: normal inspection.   SKIN: No rash, lesions or ulcers on exposed skin  MUSCULOSKELETAL No digital cyanosis. Normal gait ECS 15.  Symmetric smile fluent speech cranial nerve II to VII intact normal tongue position and tongue elevation intact hearing.  No focal arm or leg drift or weakness  NEURO: alert and oriented,  fluent speech. no focal weakness.  Amatory.  Psych: cooperative, intact judgment.  Has intact decision-making.      ED Course              ED Course as of 05/31/23 0916   Wed May 31, 2023   0847 After arrival patient was given water  and symptoms resolved.   0905 Triage note reviewed.  Vitals reviewed.   0907 Is up in the hallway asking for discharge.  Eval the patient.  His airway breathing circulation is intact.  He has full range of motion of the mouth and neck.  He has no neurologic symptoms that she has a stroke or his clinical suggestion of stridor, mass, airway abnormality and I doubt a thoracic process.  This is likely pill esophagitis or a pill that got stuck in his esophagus  transient while taking his medications.  He has no evidence of progressive dysphagia and I discussed small meals, red flags and reasons to return.  I discussed that he should call his physician to discuss upper endoscopy and he may take an over-the-counter PPI.  This is not suggestive of cardiovascular equivalent or intrathoracic process. He  has requested discharge.  A screening EMTALA evaluation is reassuring.     Procedures             Assessments & Plan (with Medical Decision Making)     I have reviewed the nursing notes.    I have reviewed the findings, diagnosis, plan and need for follow up with the patient.  ED Course as of 05/31/23 0916   Wed May 31, 2023   0847 After arrival patient was given water  and symptoms resolved.   0905 Triage note reviewed.  Vitals reviewed.   0907 Is up in the hallway asking for discharge.  Eval the patient.  His airway breathing circulation is intact.  He has full range of motion of the mouth and neck.  He has no neurologic symptoms that she has a stroke or his clinical suggestion of stridor, mass, airway abnormality and I doubt a thoracic process.  This is likely pill esophagitis or a pill that got stuck in his esophagus transient while taking his medications.  He has no evidence of progressive dysphagia and I discussed small meals, red flags and reasons to return.  I discussed that he should call his physician to discuss upper endoscopy and he may take an over-the-counter PPI.  This is not suggestive of cardiovascular equivalent or intrathoracic process. He  has requested discharge.  A screening EMTALA evaluation is reassuring.         New Prescriptions    No medications on file   Omeprazole over-the-counter.    Final diagnoses:   Dysphagia, unspecified type   Patient presents with discomfort and feelings of foreign body in his throat after taking his oral occasions this morning and abnormal taste.  I believe this is like related to the oral medications being stuck in his  esophagus.  This is now resolved after being given oral fluids he has no red flags suggest other process.  I recommend outpatient    Follow-up initiation omeprazole consideration of upper endoscopy this has occurred a couple times in the past to ensure he does not have reflux esophagitis or any esophageal abnormality such as nonemergent stricture.  His clinical examination is normal without other red flags.  He is reliable and asked for discharge shortly after arrival.        Patient has been assessed and reassessed and at this time based on the information available to me, I feel the patient is medically stable for discharge.  At times conditions evolve or change,thus repeat medical evaluation is recommended if any additional concerns arise.            5/31/2023   [facility]     Rui Crandall MD  05/31/23 0946

## 2023-05-31 NOTE — ED NOTES
Drinking water in triage to see if he can swallow. Coughed with the first drink of water but reported it did go down. Denies any pain with swallowing. Was able to swallow several drinks after with no coughing or issue noted. Reports he still has a metal or tin like taste in his mouth.

## 2023-05-31 NOTE — DISCHARGE INSTRUCTIONS
Call tomorrow to schedule follow-up appointment.  Consider endoscopy with primary care physician..  Take omeprazole 20 mg daily.      Return if you develop fever, chest pain, neck swelling face swelling, cannot swallow your own saliva, chest pain, arm or leg weakness, numbness or double vision/balance issues.

## 2023-05-31 NOTE — ED TRIAGE NOTES
C/o not being able to swallow since about 45 minutes ago. States he ate breakfast without issue, took morning pills without issue, and then went to drink coffee and couldn't swallow it. States he had a terrible taste in his mouth so thought a pill might be stuck. Reports the taste went away now. Denies any other symptoms. BEFAST negative. States did have an upper respiratory infection with cough at the end of April but has not had symptoms from that for about a week now. Denies any pain.      Triage Assessment     Row Name 05/31/23 0817       Triage Assessment (Adult)    Airway WDL WDL       Respiratory WDL    Respiratory WDL WDL

## 2023-07-28 ENCOUNTER — APPOINTMENT (OUTPATIENT)
Dept: GENERAL RADIOLOGY | Facility: HOSPITAL | Age: 73
End: 2023-07-28
Attending: PHYSICIAN ASSISTANT
Payer: COMMERCIAL

## 2023-07-28 ENCOUNTER — HOSPITAL ENCOUNTER (EMERGENCY)
Facility: HOSPITAL | Age: 73
Discharge: HOME | End: 2023-07-28
Attending: PHYSICIAN ASSISTANT | Admitting: PHYSICIAN ASSISTANT
Payer: COMMERCIAL

## 2023-07-28 VITALS
HEART RATE: 55 BPM | OXYGEN SATURATION: 97 % | RESPIRATION RATE: 16 BRPM | DIASTOLIC BLOOD PRESSURE: 67 MMHG | SYSTOLIC BLOOD PRESSURE: 129 MMHG | TEMPERATURE: 97.7 F

## 2023-07-28 DIAGNOSIS — S46.911A STRAIN OF RIGHT SHOULDER, INITIAL ENCOUNTER: ICD-10-CM

## 2023-07-28 PROCEDURE — G0463 HOSPITAL OUTPT CLINIC VISIT: HCPCS

## 2023-07-28 PROCEDURE — 73030 X-RAY EXAM OF SHOULDER: CPT | Mod: RT

## 2023-07-28 PROCEDURE — 99213 OFFICE O/P EST LOW 20 MIN: CPT | Performed by: PHYSICIAN ASSISTANT

## 2023-07-28 NOTE — DISCHARGE INSTRUCTIONS
Continue the Aleve as directed.  Apply heat as needed for pain.   PT referral was placed.   Follow up with primary care in 1 week.   Return here sooner with any new or worsening symptoms.

## 2023-07-28 NOTE — ED TRIAGE NOTES
Patient presents to urgent care for right shoulder pain that started Tuesday. Patient denies any injury. Patient states it's intermittent pain with or without movement.  Patient states when he lifts his arm up over his head than the pain is about 4-5/10. Patient states if he takes aleve that seems to help.   Triage Assessment       Row Name 07/28/23 1144       Triage Assessment (Adult)    Airway WDL WDL

## 2023-07-28 NOTE — ED PROVIDER NOTES
History     Chief Complaint   Patient presents with    Shoulder Pain     HPI  Augie Hanccok is a 72 year old male who presents with right shoulder pain x 4 days that began after using a razor blade with his right hand to clear old caulk from windows. Pt is worse with movement of the right shoulder and with palpation to the area. Overall, the pain has improved. It is relieved with Aleve. Has h/o left shoulder rotator cuff repair but no diagnosis of rotator cuff issues with the right. Denies CP, cough, dyspnea, fevers, or n/v.     Allergies:  No Known Allergies    Problem List:    Patient Active Problem List    Diagnosis Date Noted    Prostate cancer (H) 01/30/2020     Priority: Medium    Advanced directives, counseling/discussion 09/21/2016     Priority: Medium     Advance Care Planning 9/21/2016: ACP Review of Chart / Resources Provided:  Reviewed chart for advance care plan.  Augie Hancock has no plan or code status on file. Discussed available resources and provided with information. Pt states he has paperwork at home.  Added by Shena Singletary            ACP (advance care planning) 09/15/2016     Priority: Medium     Advance Care Planning 9/15/2016: ACP Review of Chart / Resources Provided:  Reviewed chart for advance care plan.  Augie Hancock has been provided information and resources to begin or update their advance care plan.  Added by Pilar Carbone            Prostate nodule 09/15/2016     Priority: Medium    Astigmatism 07/14/2011     Priority: Medium     Formatting of this note might be different from the original.  IMO Update      Osteoarthrosis involving lower leg 05/06/2011     Priority: Medium     Overview:   IMO Update  IMO Update 10 2016  Replacing diagnoses that were inactivated after the 10/1/2021 regulatory import.          Past Medical History:    Past Medical History:   Diagnosis Date    Femoral fracture 4/18/2011    OA (osteoarthritis) of knee 4/18/2011    Routine general  medical examination at a health care facility 5/7/2007    Tibia/fibula fracture 4/18/2011       Past Surgical History:    Past Surgical History:   Procedure Laterality Date    COLONOSCOPY  12/17/2012    Repeat in 10 years    COLONOSCOPY  2004    COLONOSCOPY N/A 12/15/2022    Procedure: COLONOSCOPY;  Surgeon: Sony Monzon MD;  Location: [facility]    JOINT REPLACEMENT  4/2011    leg fracture repair secondary to trauma      bilateral    RELEASE CARPAL TUNNEL Right 4/7/2016    Procedure: RELEASE CARPAL TUNNEL;  Surgeon: Moise Izaguirre MD;  Location: [facility]       Family History:    Family History   Problem Relation Age of Onset    Cerebrovascular Disease Father     Heart Disease Father 70        MI; cause of death    Coronary Artery Disease Other     Diabetes Brother         type 2    Diabetes Mother         type 2    Diabetes Sister         type 2    Coronary Artery Disease Sister 64        MI       Social History:  Marital Status:   [2]  Social History     Tobacco Use    Smoking status: Never    Smokeless tobacco: Current     Types: Chew    Tobacco comments:     no passive exposure   Vaping Use    Vaping Use: Never used   Substance Use Topics    Alcohol use: No     Alcohol/week: 0.0 standard drinks of alcohol        Medications:    albuterol (PROAIR HFA/PROVENTIL HFA/VENTOLIN HFA) 108 (90 Base) MCG/ACT inhaler  Ascorbic Acid (VITAMIN C) 500 MG CAPS  ASPIRIN PO  GLUCOSAMINE-CHONDROIT-MSM-C-MN PO  nicotine (COMMIT) 2 MG lozenge  nicotine (NICORETTE) 4 MG lozenge  UNABLE TO FIND  vitamin D3 (CHOLECALCIFEROL) 2000 units (50 mcg) tablet          Review of Systems   All other systems reviewed and are negative.      Physical Exam   BP: 129/67  Pulse: 55  Temp: 97.7  F (36.5  C)  Resp: 16  SpO2: 97 %      Physical Exam  Vitals and nursing note reviewed.   Constitutional:       General: He is not in acute distress.     Appearance: He is well-developed. He is not diaphoretic.   HENT:      Head: Normocephalic and  atraumatic.      Right Ear: Tympanic membrane, ear canal and external ear normal.      Left Ear: Tympanic membrane, ear canal and external ear normal.      Nose: Nose normal.      Mouth/Throat:      Pharynx: No oropharyngeal exudate.   Eyes:      General: No scleral icterus.        Right eye: No discharge.         Left eye: No discharge.      Conjunctiva/sclera: Conjunctivae normal.      Pupils: Pupils are equal, round, and reactive to light.   Neck:      Vascular: No JVD.   Cardiovascular:      Rate and Rhythm: Normal rate and regular rhythm.      Heart sounds: Normal heart sounds. No murmur heard.     No friction rub. No gallop.   Pulmonary:      Effort: Pulmonary effort is normal. No respiratory distress.      Breath sounds: Normal breath sounds. No wheezing or rales.   Chest:      Chest wall: No tenderness.   Abdominal:      Tenderness: There is no abdominal tenderness.   Musculoskeletal:         General: Normal range of motion.      Right shoulder: Tenderness present. No swelling, deformity, effusion or crepitus. Normal strength.        Arms:       Cervical back: Normal range of motion and neck supple.      Comments: Muscular tenderness to area encircled in red. Pain with active and passive abduction of right shoulder to area encircled in red.    Lymphadenopathy:      Cervical: No cervical adenopathy.   Skin:     General: Skin is warm and dry.      Coloration: Skin is not pale.      Findings: No erythema or rash.   Neurological:      Mental Status: He is alert and oriented to person, place, and time.      Cranial Nerves: No cranial nerve deficit.      Coordination: Coordination normal.   Psychiatric:         Behavior: Behavior normal.         Thought Content: Thought content normal.         Judgment: Judgment normal.         ED Course                 Procedures       Results for orders placed or performed during the hospital encounter of 07/28/23 (from the past 24 hour(s))   XR Shoulder Right G/E 3 Views     Narrative    Exam: XR SHOULDER RIGHT G/E 3 VIEWS    Technique: Right shoulder, 4 views    Comparison: None.    Exam reason: right shoulder/supra-scapular pain, strain injury    Findings:  No acute fracture or dislocation. Mild acromioclavicular degenerative  change.     Soft tissues appear normal.      Impression    Impression:  No acute fracture or dislocation.    SOHEILA REDDY MD         SYSTEM ID:  [id number]       Medications - No data to display    Assessments & Plan (with Medical Decision Making)   Exam consistent with right shoulder strain. X-ray negative for acute findings. Supportive care recommended and PT referral placed to use if pain persists past 1 week as there may be some underlying rotator cuff disease. He was discharged home in good condition following.     Plan:   Continue the Aleve as directed.  Apply heat as needed for pain.   PT referral was placed.   Follow up with primary care in 1 week.   Return here sooner with any new or worsening symptoms.   I have reviewed the nursing notes.    I have reviewed the findings, diagnosis, plan and need for follow up with the patient.    Discharge Medication List as of 7/28/2023 12:33 PM          Final diagnoses:   Strain of right shoulder, initial encounter       7/28/2023   [facility]

## 2023-07-28 NOTE — ED TRIAGE NOTES
Reports right shoulder pain that started on Tuesday. Intermittent with certain movements. Feels more painful lifting arm up. Denies CP or any other symptoms with it. Denies injury that he is aware of although is very active.

## 2023-08-04 ENCOUNTER — THERAPY VISIT (OUTPATIENT)
Dept: PHYSICAL THERAPY | Facility: HOSPITAL | Age: 73
End: 2023-08-04
Attending: PHYSICIAN ASSISTANT
Payer: COMMERCIAL

## 2023-08-04 DIAGNOSIS — S46.911A STRAIN OF RIGHT SHOULDER, INITIAL ENCOUNTER: Primary | ICD-10-CM

## 2023-08-04 PROCEDURE — 97010 HOT OR COLD PACKS THERAPY: CPT | Mod: GP

## 2023-08-04 PROCEDURE — 97110 THERAPEUTIC EXERCISES: CPT | Mod: GP

## 2023-08-04 PROCEDURE — 97161 PT EVAL LOW COMPLEX 20 MIN: CPT | Mod: GP

## 2023-08-04 PROCEDURE — 97032 APPL MODALITY 1+ESTIM EA 15: CPT | Mod: GP

## 2023-08-07 PROBLEM — S46.911A STRAIN OF RIGHT SHOULDER, INITIAL ENCOUNTER: Status: ACTIVE | Noted: 2023-08-07

## 2023-08-07 NOTE — PROGRESS NOTES
PHYSICAL THERAPY EVALUATION  Type of Visit: Evaluation    See electronic medical record for Abuse and Falls Screening details.    Subjective       Presenting condition or subjective complaint:  Pt presents with c/o R superior shoulder/neck muscle tightness, spasm, and pain.  Date of onset: 23    Relevant medical history:   Cancer, arthritis  Dates & types of surgery:  Broken legs , knee replacement , carpal tunnel  and 2018, left rotator cuff 2018    Prior diagnostic imaging/testing results:     NA  Prior therapy history for the same diagnosis, illness or injury:    NA    Prior Level of Function  Transfers: Independent  Ambulation: Independent  ADL: Independent  IADL: Driving, Finances, Housekeeping, Laundry, Meal preparation, Medication management, Yard work    Living Environment  Social support:   Family   Type of home:   xHouse  Stairs to enter the home:       Yes  Ramp:   Yes  Stairs inside the home:   Yes      Help at home:  No    Employment:    Retired  Hobbies/Interests:  Old cars, hunting, Busy Moosing, trap shooting, fishing, side by side riding    Patient goals for therapy:  No pain    Pain assessment: Pain present  Location: R upper trap/superior shoulder/neck/Ratin/10     Objective   CERVICAL SPINE EVALUATION  PAIN: Pain Level at Rest: 1/10  Pain Level with Use: 7/10  Pain Location: cervical spine and shoulder  Pain Quality: Aching, Tiring, and spasm  Pain is Exacerbated By: Upper extremity use   Pain is Relieved By: rest and positioning in sitting with support under R elbow  INTEGUMENTARY (edema, incisions): WNL  POSTURE: Standing Posture: Rounded shoulders, Forward head  Sitting Posture: Rounded shoulders, Forward head  ROM:  Cervical ROM limited by 75% in all planes  FLEXIBILITY:  Cervical flexibility is severely limited    PALPATION:  Increased soreness along upper trap and scalenes with severe tightness in same area    SHOULDER EVALUATION  PAIN: See above  INTEGUMENTARY (edema,  incisions): WNL  ROM: AROM WFL  STRENGTH: WNL  FLEXIBILITY:  Decreased superior shoulder flexibility and decreased scapular  muscle flexibility  SPECIAL TESTS: WNL  PALPATION: See above  JOINT MOBILITY: WNL  CERVICAL SCREEN:  See above    Assessment & Plan   CLINICAL IMPRESSIONS  Medical Diagnosis: R shoulder pain    Treatment Diagnosis: R shoulder and neck pain   Impression/Assessment: Patient is a 72 year old male with complaints of R superior shoulder and R lateral neck pain and soreness following strenuous UE work at home approximately 2 weeks ago.  The following significant findings have been identified: Pain, Decreased ROM/flexibility, Impaired muscle performance, and Decreased activity tolerance. These impairments interfere with their ability to perform recreational activities and household chores as compared to previous level of function.     Clinical Decision Making (Complexity):  Clinical Presentation: Stable/Uncomplicated  Clinical Presentation Rationale: based on medical and personal factors listed in PT evaluation  Clinical Decision Making (Complexity): Low complexity    PLAN OF CARE  Treatment Interventions:  Modalities: Dry Needling, E-stim, Hot Pack, Ultrasound  Interventions: Manual Therapy, Neuromuscular Re-education, Therapeutic Activity, Therapeutic Exercise    Long Term Goals     PT Goal 1  Goal Identifier: STG 1  Goal Description: Pt will be independent with HEP for flexibility and strengthening without increased discomfort.  Rationale: to maximize safety and independence with performance of ADLs and functional tasks  Target Date: 09/01/23  PT Goal 2  Goal Identifier: STG 2  Goal Description: Pt will report decreased pain and soreness in R shoulder/neck by 25% or greater.  Rationale: to maximize safety and independence with performance of ADLs and functional tasks  Target Date: 09/01/23  PT Goal 3  Goal Identifier: LTG 1  Goal Description: Pt will demonstrate improved cervical ROM in all planes  by 25%.  Rationale: to maximize safety and independence with performance of ADLs and functional tasks  Target Date: 09/29/23  PT Goal 4  Goal Identifier: LTG 2  Goal Description: Pt will report improved pain with decreased muscle tightness in R shoulder/neck area back to baseline in order to resume all previous activities without increased pain.  Rationale: to maximize safety and independence with performance of ADLs and functional tasks  Target Date: 09/29/23      Frequency of Treatment: 2x/week  Duration of Treatment: 8 weeks    Education Assessment:   Learner/Method: Patient;Demonstration;Pictures/Video  Education Comments: Pt instructed in upper trap and scalene stretch to be performed twice daily, bilaterally x 3 reps each.    Risks and benefits of evaluation/treatment have been explained.   Patient/Family/caregiver agrees with Plan of Care.     Evaluation Time:     PT Eval, Low Complexity Minutes (09874): 15       Signing Clinician: Shawna Vazquez PT      [facility]                                                                                   OUTPATIENT PHYSICAL THERAPY      PLAN OF TREATMENT FOR OUTPATIENT REHABILITATION   Patient's Last Name, First Name, Augie Caraballo YOB: 1950   Provider's Name   [facility]   Medical Record No.  [id number]     Onset Date: 07/20/23  Start of Care Date: 08/04/23     Medical Diagnosis:  R shoulder pain      PT Treatment Diagnosis:  R shoulder and neck pain Plan of Treatment  Frequency/Duration: 2x/week/ 8 weeks    Certification date from 08/04/23 to 09/29/23         See note for plan of treatment details and functional goals     Shawna Vazquez, PT                         I CERTIFY THE NEED FOR THESE SERVICES FURNISHED UNDER        THIS PLAN OF TREATMENT AND WHILE UNDER MY CARE     (Physician attestation of this document indicates review and certification of the therapy plan).                 Referring Provider:  Abril Ramires      Initial Assessment  See Epic Evaluation- Start of Care Date: 08/04/23

## 2023-08-08 ENCOUNTER — THERAPY VISIT (OUTPATIENT)
Dept: PHYSICAL THERAPY | Facility: HOSPITAL | Age: 73
End: 2023-08-08
Attending: FAMILY MEDICINE
Payer: COMMERCIAL

## 2023-08-08 DIAGNOSIS — S46.911A STRAIN OF RIGHT SHOULDER, INITIAL ENCOUNTER: Primary | ICD-10-CM

## 2023-08-08 PROCEDURE — 97032 APPL MODALITY 1+ESTIM EA 15: CPT | Mod: GP,CQ

## 2023-08-08 PROCEDURE — 97110 THERAPEUTIC EXERCISES: CPT | Mod: GP,CQ

## 2023-08-08 PROCEDURE — 97010 HOT OR COLD PACKS THERAPY: CPT | Mod: GP,CQ

## 2023-08-11 ENCOUNTER — THERAPY VISIT (OUTPATIENT)
Dept: PHYSICAL THERAPY | Facility: HOSPITAL | Age: 73
End: 2023-08-11
Attending: PHYSICIAN ASSISTANT
Payer: COMMERCIAL

## 2023-08-11 DIAGNOSIS — S46.911A STRAIN OF RIGHT SHOULDER, INITIAL ENCOUNTER: Primary | ICD-10-CM

## 2023-08-11 PROCEDURE — 97010 HOT OR COLD PACKS THERAPY: CPT | Mod: GP

## 2023-08-11 PROCEDURE — 97032 APPL MODALITY 1+ESTIM EA 15: CPT | Mod: GP

## 2023-08-11 PROCEDURE — 97110 THERAPEUTIC EXERCISES: CPT | Mod: GP

## 2023-08-15 ENCOUNTER — THERAPY VISIT (OUTPATIENT)
Dept: PHYSICAL THERAPY | Facility: HOSPITAL | Age: 73
End: 2023-08-15
Attending: FAMILY MEDICINE
Payer: COMMERCIAL

## 2023-08-15 DIAGNOSIS — S46.911A STRAIN OF RIGHT SHOULDER, INITIAL ENCOUNTER: Primary | ICD-10-CM

## 2023-08-15 PROCEDURE — 97010 HOT OR COLD PACKS THERAPY: CPT | Mod: GP

## 2023-08-15 PROCEDURE — 97110 THERAPEUTIC EXERCISES: CPT | Mod: GP

## 2023-08-15 PROCEDURE — 97032 APPL MODALITY 1+ESTIM EA 15: CPT | Mod: GP

## 2023-08-18 ENCOUNTER — THERAPY VISIT (OUTPATIENT)
Dept: PHYSICAL THERAPY | Facility: HOSPITAL | Age: 73
End: 2023-08-18
Attending: PHYSICIAN ASSISTANT
Payer: COMMERCIAL

## 2023-08-18 DIAGNOSIS — S46.911A STRAIN OF RIGHT SHOULDER, INITIAL ENCOUNTER: Primary | ICD-10-CM

## 2023-08-18 PROCEDURE — 97140 MANUAL THERAPY 1/> REGIONS: CPT | Mod: GP

## 2023-08-18 PROCEDURE — 97110 THERAPEUTIC EXERCISES: CPT | Mod: GP

## 2023-08-22 ENCOUNTER — THERAPY VISIT (OUTPATIENT)
Dept: PHYSICAL THERAPY | Facility: HOSPITAL | Age: 73
End: 2023-08-22
Attending: FAMILY MEDICINE
Payer: COMMERCIAL

## 2023-08-22 DIAGNOSIS — S46.911A STRAIN OF RIGHT SHOULDER, INITIAL ENCOUNTER: Primary | ICD-10-CM

## 2023-08-22 PROCEDURE — 97110 THERAPEUTIC EXERCISES: CPT | Mod: GP,CQ

## 2023-08-25 ENCOUNTER — THERAPY VISIT (OUTPATIENT)
Dept: PHYSICAL THERAPY | Facility: HOSPITAL | Age: 73
End: 2023-08-25
Attending: FAMILY MEDICINE
Payer: COMMERCIAL

## 2023-08-25 DIAGNOSIS — S46.911A STRAIN OF RIGHT SHOULDER, INITIAL ENCOUNTER: Primary | ICD-10-CM

## 2023-08-25 PROCEDURE — 97110 THERAPEUTIC EXERCISES: CPT | Mod: GP,CQ

## 2023-08-29 ENCOUNTER — THERAPY VISIT (OUTPATIENT)
Dept: PHYSICAL THERAPY | Facility: HOSPITAL | Age: 73
End: 2023-08-29
Attending: FAMILY MEDICINE
Payer: COMMERCIAL

## 2023-08-29 DIAGNOSIS — S46.911A STRAIN OF RIGHT SHOULDER, INITIAL ENCOUNTER: Primary | ICD-10-CM

## 2023-08-29 PROCEDURE — 97110 THERAPEUTIC EXERCISES: CPT | Mod: GP

## 2023-09-11 ENCOUNTER — HOSPITAL ENCOUNTER (EMERGENCY)
Facility: HOSPITAL | Age: 73
Discharge: HOME | End: 2023-09-11
Attending: STUDENT IN AN ORGANIZED HEALTH CARE EDUCATION/TRAINING PROGRAM
Payer: COMMERCIAL

## 2023-09-11 ENCOUNTER — APPOINTMENT (OUTPATIENT)
Dept: GENERAL RADIOLOGY | Facility: HOSPITAL | Age: 73
End: 2023-09-11
Payer: COMMERCIAL

## 2023-09-11 VITALS
RESPIRATION RATE: 16 BRPM | SYSTOLIC BLOOD PRESSURE: 124 MMHG | TEMPERATURE: 96.9 F | DIASTOLIC BLOOD PRESSURE: 74 MMHG | OXYGEN SATURATION: 99 % | HEART RATE: 60 BPM

## 2023-09-11 DIAGNOSIS — J20.9 ACUTE BRONCHITIS WITH SYMPTOMS > 10 DAYS: ICD-10-CM

## 2023-09-11 PROCEDURE — 71046 X-RAY EXAM CHEST 2 VIEWS: CPT

## 2023-09-11 PROCEDURE — 99213 OFFICE O/P EST LOW 20 MIN: CPT

## 2023-09-11 PROCEDURE — G0463 HOSPITAL OUTPT CLINIC VISIT: HCPCS | Mod: 25

## 2023-09-11 PROCEDURE — G0463 HOSPITAL OUTPT CLINIC VISIT: HCPCS

## 2023-09-11 RX ORDER — AZITHROMYCIN 250 MG/1
TABLET, FILM COATED ORAL
Qty: 6 TABLET | Refills: 0 | Status: SHIPPED | OUTPATIENT
Start: 2023-09-11 | End: 2023-09-16

## 2023-09-11 RX ORDER — PREDNISONE 20 MG/1
TABLET ORAL
Qty: 10 TABLET | Refills: 0 | Status: SHIPPED | OUTPATIENT
Start: 2023-09-11 | End: 2023-09-26

## 2023-09-11 ASSESSMENT — ACTIVITIES OF DAILY LIVING (ADL): ADLS_ACUITY_SCORE: 35

## 2023-09-11 ASSESSMENT — ENCOUNTER SYMPTOMS
SINUS PAIN: 0
APPETITE CHANGE: 0
DIARRHEA: 0
COUGH: 1
SHORTNESS OF BREATH: 0
FATIGUE: 0
FEVER: 0
CHILLS: 0
RHINORRHEA: 0
NAUSEA: 0
SORE THROAT: 0
VOMITING: 0
CHEST TIGHTNESS: 1
SINUS PRESSURE: 0
ACTIVITY CHANGE: 0

## 2023-09-11 NOTE — ED PROVIDER NOTES
History     Chief Complaint   Patient presents with    Cough     HPI  Augie Hancock is a 72 year old male who presents to the urgent care with a 2 week history of a cough. He denies fevers, chills, chest pain, abd pain, n/v/d, ear pain, sore throat, and shortness of breath. He found an old albuterol inhaler that he used last night and was able to sleep. He has not taken any OTC medications. Denies history of asthma and COPD. Non smoker. No other ill members in home.     Allergies:  No Known Allergies    Problem List:    Patient Active Problem List    Diagnosis Date Noted    Strain of right shoulder, initial encounter 08/07/2023     Priority: Medium    Prostate cancer (H) 01/30/2020     Priority: Medium    Advanced directives, counseling/discussion 09/21/2016     Priority: Medium     Advance Care Planning 9/21/2016: ACP Review of Chart / Resources Provided:  Reviewed chart for advance care plan.  Augie Hancock has no plan or code status on file. Discussed available resources and provided with information. Pt states he has paperwork at home.  Added by Shena Singletary            ACP (advance care planning) 09/15/2016     Priority: Medium     Advance Care Planning 9/15/2016: ACP Review of Chart / Resources Provided:  Reviewed chart for advance care plan.  Augie Hancock has been provided information and resources to begin or update their advance care plan.  Added by Pilar Carbone            Prostate nodule 09/15/2016     Priority: Medium    Astigmatism 07/14/2011     Priority: Medium     Formatting of this note might be different from the original.  IMO Update      Osteoarthrosis involving lower leg 05/06/2011     Priority: Medium     Overview:   IMO Update  IMO Update 10 2016  Replacing diagnoses that were inactivated after the 10/1/2021 regulatory import.          Past Medical History:    Past Medical History:   Diagnosis Date    Femoral fracture 4/18/2011    OA (osteoarthritis) of knee 4/18/2011     Routine general medical examination at a [facility] care facility 5/7/2007    Tibia/fibula fracture 4/18/2011       Past Surgical History:    Past Surgical History:   Procedure Laterality Date    COLONOSCOPY  12/17/2012    Repeat in 10 years    COLONOSCOPY  2004    COLONOSCOPY N/A 12/15/2022    Procedure: COLONOSCOPY;  Surgeon: Sony Monzon MD;  Location: [facility] OR    JOINT REPLACEMENT  4/2011    leg fracture repair secondary to trauma      bilateral    RELEASE CARPAL TUNNEL Right 4/7/2016    Procedure: RELEASE CARPAL TUNNEL;  Surgeon: Moise Izaguirre MD;  Location: [facility] OR       Family History:    Family History   Problem Relation Age of Onset    Cerebrovascular Disease Father     Heart Disease Father 70        MI; cause of death    Coronary Artery Disease Other     Diabetes Brother         type 2    Diabetes Mother         type 2    Diabetes Sister         type 2    Coronary Artery Disease Sister 64        MI       Social History:  Marital Status:   [2]  Social History     Tobacco Use    Smoking status: Never    Smokeless tobacco: Current     Types: Chew    Tobacco comments:     no passive exposure   Vaping Use    Vaping Use: Never used   Substance Use Topics    Alcohol use: No     Alcohol/week: 0.0 standard drinks of alcohol        Medications:    albuterol (PROAIR HFA/PROVENTIL HFA/VENTOLIN HFA) 108 (90 Base) MCG/ACT inhaler  Ascorbic Acid (VITAMIN C) 500 MG CAPS  ASPIRIN PO  azithromycin (ZITHROMAX Z-ARIEL) 250 MG tablet  nicotine (COMMIT) 2 MG lozenge  nicotine (NICORETTE) 4 MG lozenge  predniSONE (DELTASONE) 20 MG tablet  vitamin D3 (CHOLECALCIFEROL) 2000 units (50 mcg) tablet  GLUCOSAMINE-CHONDROIT-MSM-C-MN PO  UNABLE TO FIND          Review of Systems   Constitutional:  Negative for activity change, appetite change, chills, fatigue and fever.   HENT:  Negative for congestion, ear pain, rhinorrhea, sinus pressure, sinus pain and sore throat.    Respiratory:  Positive for cough and chest tightness  (while coughing). Negative for shortness of breath.    Cardiovascular:  Negative for chest pain and leg swelling.   Gastrointestinal:  Negative for diarrhea, nausea and vomiting.   All other systems reviewed and are negative.      Physical Exam   BP: 124/74  Pulse: 60  Temp: 96.9  F (36.1  C)  Resp: 16  SpO2: 99 %      Physical Exam  Vitals and nursing note reviewed.   Constitutional:       General: He is not in acute distress.     Appearance: Normal appearance. He is normal weight. He is not ill-appearing or toxic-appearing.   HENT:      Right Ear: Tympanic membrane, ear canal and external ear normal. There is no impacted cerumen.      Left Ear: Tympanic membrane, ear canal and external ear normal. There is no impacted cerumen.      Nose: No congestion or rhinorrhea.      Mouth/Throat:      Mouth: Mucous membranes are moist.      Pharynx: Oropharynx is clear. No oropharyngeal exudate or posterior oropharyngeal erythema.   Eyes:      General:         Right eye: No discharge.         Left eye: No discharge.      Pupils: Pupils are equal, round, and reactive to light.   Cardiovascular:      Rate and Rhythm: Normal rate and regular rhythm.      Pulses: Normal pulses.      Heart sounds: Normal heart sounds. No murmur heard.  Pulmonary:      Effort: Pulmonary effort is normal. No respiratory distress.      Breath sounds: Normal breath sounds. No stridor. No wheezing, rhonchi or rales.   Skin:     General: Skin is warm and dry.      Capillary Refill: Capillary refill takes less than 2 seconds.   Neurological:      Mental Status: He is alert.   Psychiatric:         Mood and Affect: Mood normal.         Behavior: Behavior normal.         Thought Content: Thought content normal.         Judgment: Judgment normal.         ED Course                 Procedures              Results for orders placed or performed during the hospital encounter of 09/11/23 (from the past 24 hour(s))   Chest XR,  PA & LAT    Narrative     Procedure:XR CHEST 2 VIEWS    Clinical history:Male, 72 years, 2 week history of cough    Technique: Two views are submitted.    Comparison: 12/1/2021    Findings: The cardiac silhouette is within normal limits.. The  pulmonary vasculature is within normal limits.    The lungs are clear. Bony structures are unremarkable.  Numerous  calcified lymph nodes again seen throughout the mediastinum and hilar  regions.      Impression    Impression:   No acute abnormality. No evidence of acute or active cardiopulmonary  disease.    GERARDO RODRIGUEZ MD         SYSTEM ID:  [id number]       Medications - No data to display    Assessments & Plan (with Medical Decision Making)     I have reviewed the nursing notes.    I have reviewed the findings, diagnosis, plan and need for follow up with the patient.  Augie Hancock is a 72 year old male who presents to the urgent care with a 2 week history of a cough. He denies fevers, chills, chest pain, abd pain, n/v/d, and shortness of breath. He found an old albuterol inhaler that he used last night and was able to sleep. He has not taken any OTC medications. Denies history of asthma and COPD. Non smoker. No other ill members in home.    MDM: CXR: no acute abnormality. No evidence of acute or active cardiopulmonary disease, per radiologist.     VSS and afebrile. Lungs clear, heart tones regular. TM clear bilaterally. There is no erythema to posterior oropharynx. He is non toxic in appearance. No noted distress. With his symptoms being present for greater than 10 days with no improvement, will treat bronchitis. Return precautions discussed. He is in agreement with plan.     (J20.9) Acute bronchitis with symptoms > 10 days  Plan: Prednisone daily for 5 days. Avoid ibuprofen and naproxen while taking this. Azithromycin daily for 5 days. Push fluids. Return with any chest pain, shortness of breath, or other concerns. Follow up in the clinic as needed. Understanding verbalized.          New  Prescriptions    AZITHROMYCIN (ZITHROMAX Z-ARIEL) 250 MG TABLET    Two tablets on the first day, then one tablet daily for the next 4 days    PREDNISONE (DELTASONE) 20 MG TABLET    Take two tablets (= 40mg) each day for 5 (five) days       Final diagnoses:   Acute bronchitis with symptoms > 10 days       9/11/2023   [facility]       Ivonne Parra NP  09/11/23 0924

## 2023-09-11 NOTE — DISCHARGE INSTRUCTIONS
Prednisone daily for 5 days. Avoid ibuprofen and naproxen while taking this. Azithromycin daily for 5 days. Push fluids. Return with any chest pain, shortness of breath, or other concerns. Follow up in the clinic as needed.

## 2023-09-11 NOTE — ED TRIAGE NOTES
Pt presents with c/o having a cough  Pt states having mucous sputum denies any color to it   Denies any other symptoms   Denies any SOB or fevers chills N/V/D   S/x started a week ago   Pt states used an old albuterol inhaler which helped pt sleep better last night and has tried cough drops with no relief. No other otc meds taken today

## 2023-09-26 ENCOUNTER — OFFICE VISIT (OUTPATIENT)
Dept: FAMILY MEDICINE | Facility: OTHER | Age: 73
End: 2023-09-26
Attending: FAMILY MEDICINE
Payer: COMMERCIAL

## 2023-09-26 ENCOUNTER — ANCILLARY PROCEDURE (OUTPATIENT)
Dept: GENERAL RADIOLOGY | Facility: OTHER | Age: 73
End: 2023-09-26
Attending: FAMILY MEDICINE
Payer: COMMERCIAL

## 2023-09-26 ENCOUNTER — TELEPHONE (OUTPATIENT)
Dept: FAMILY MEDICINE | Facility: OTHER | Age: 73
End: 2023-09-26

## 2023-09-26 VITALS
HEART RATE: 60 BPM | BODY MASS INDEX: 22.63 KG/M2 | OXYGEN SATURATION: 97 % | SYSTOLIC BLOOD PRESSURE: 122 MMHG | WEIGHT: 136 LBS | TEMPERATURE: 97.6 F | DIASTOLIC BLOOD PRESSURE: 60 MMHG

## 2023-09-26 DIAGNOSIS — R05.9 COUGH, UNSPECIFIED TYPE: Primary | ICD-10-CM

## 2023-09-26 DIAGNOSIS — J32.9 SINUSITIS, UNSPECIFIED CHRONICITY, UNSPECIFIED LOCATION: ICD-10-CM

## 2023-09-26 DIAGNOSIS — R05.9 COUGH, UNSPECIFIED TYPE: ICD-10-CM

## 2023-09-26 LAB
BASOPHILS # BLD AUTO: 0 10E3/UL (ref 0–0.2)
BASOPHILS NFR BLD AUTO: 1 %
EOSINOPHIL # BLD AUTO: 0.1 10E3/UL (ref 0–0.7)
EOSINOPHIL NFR BLD AUTO: 2 %
ERYTHROCYTE [DISTWIDTH] IN BLOOD BY AUTOMATED COUNT: 12.7 % (ref 10–15)
HCT VFR BLD AUTO: 42.9 % (ref 40–53)
HGB BLD-MCNC: 15 G/DL (ref 13.3–17.7)
IMM GRANULOCYTES # BLD: 0 10E3/UL
IMM GRANULOCYTES NFR BLD: 0 %
LYMPHOCYTES # BLD AUTO: 1.5 10E3/UL (ref 0.8–5.3)
LYMPHOCYTES NFR BLD AUTO: 24 %
MCH RBC QN AUTO: 30.4 PG (ref 26.5–33)
MCHC RBC AUTO-ENTMCNC: 35 G/DL (ref 31.5–36.5)
MCV RBC AUTO: 87 FL (ref 78–100)
MONOCYTES # BLD AUTO: 0.6 10E3/UL (ref 0–1.3)
MONOCYTES NFR BLD AUTO: 10 %
NEUTROPHILS # BLD AUTO: 4.1 10E3/UL (ref 1.6–8.3)
NEUTROPHILS NFR BLD AUTO: 64 %
PLATELET # BLD AUTO: 200 10E3/UL (ref 150–450)
RBC # BLD AUTO: 4.93 10E6/UL (ref 4.4–5.9)
WBC # BLD AUTO: 6.5 10E3/UL (ref 4–11)

## 2023-09-26 PROCEDURE — G0463 HOSPITAL OUTPT CLINIC VISIT: HCPCS | Mod: 25

## 2023-09-26 PROCEDURE — 36415 COLL VENOUS BLD VENIPUNCTURE: CPT | Mod: ZL | Performed by: FAMILY MEDICINE

## 2023-09-26 PROCEDURE — 99214 OFFICE O/P EST MOD 30 MIN: CPT | Performed by: FAMILY MEDICINE

## 2023-09-26 PROCEDURE — 71046 X-RAY EXAM CHEST 2 VIEWS: CPT | Mod: TC

## 2023-09-26 PROCEDURE — 85025 COMPLETE CBC W/AUTO DIFF WBC: CPT | Mod: ZL | Performed by: FAMILY MEDICINE

## 2023-09-26 RX ORDER — LORATADINE 10 MG/1
10 TABLET ORAL DAILY
Qty: 30 TABLET | Refills: 1 | Status: SHIPPED | OUTPATIENT
Start: 2023-09-26 | End: 2024-01-25

## 2023-09-26 ASSESSMENT — PAIN SCALES - GENERAL: PAINLEVEL: NO PAIN (0)

## 2023-09-26 NOTE — PROGRESS NOTES
Assessment & Plan     Cough, unspecified type  Ongoing.  Failed azithro and steroid.  Consider allergic but I'm thinking more sinuses.  Augmentin and claritin both started.  Followup with ongoing concerns.      Sinusitis, unspecified chronicity, unspecified location  As above.             MED REC REQUIRED  Post Medication Reconciliation Status:       No follow-ups on file.    Jose Guadalupe Cotton MD  [facility]    Guillaume Ghosh is a 72 year old, presenting for the following health issues:  ER F/U      HPI     ED[place]UC Follow[place]    [place]  [facility] [place] of visit: 9/11/23  Reason for visit: bronchitis   Current Status: not getting better         Review of Systems   Constitutional, HEENT, cardiovascular, pulmonary, gi and gu systems are negative, except as otherwise noted.      Objective    /60   Pulse 60   Temp 97.6  F (36.4  C) (Tympanic)   Wt 61.7 kg (136 lb)   SpO2 97%   BMI 22.63 kg/m    Body mass index is 22.63 kg/m .  Physical Exam   GENERAL: healthy, alert and no distress  EYES: Eyes grossly normal to inspection, PERRL and conjunctivae and sclerae normal  HENT: ear canals and TM's normal, nose and mouth without ulcers or lesions  NECK: no adenopathy, no asymmetry, masses, or scars and thyroid normal to palpation  RESP: lungs clear to auscultation - no rales, rhonchi or wheezes  CV: regular rate and rhythm, normal S1 S2, no S3 or S4, no murmur, click or rub, no peripheral edema and peripheral pulses strong  ABDOMEN: soft, nontender, no hepatosplenomegaly, no masses and bowel sounds normal  MS: no gross musculoskeletal defects noted, no edema    Cxr clear.  Cbc normal.

## 2023-09-26 NOTE — TELEPHONE ENCOUNTER
8:58 AM    Reason for Call: OVERBOOK    Patient is having the following symptoms: has had a cough for a couple wks/has been into UC not getting better.    The patient is requesting an appointment for this week with Dr Cotton.    Was an appointment offered for this call? No  If yes : Appointment type              Date    Preferred method for responding to this message: Telephone Call  What is your phone number ?[phone]     If we cannot reach you directly, may we leave a detailed response at the number you provided? Yes    Can this message wait until your PCP/provider returns, if unavailable today? Not applicable    Keshia Russ

## 2023-10-04 ENCOUNTER — TELEPHONE (OUTPATIENT)
Dept: FAMILY MEDICINE | Facility: OTHER | Age: 73
End: 2023-10-04

## 2023-10-04 NOTE — TELEPHONE ENCOUNTER
10:07 AM    Reason for Call: OVERBOOK    Patient is having the following symptoms: Cough is still not better , actually getting worse .    The patient is requesting an appointment for Next week with Dr. Cotton.    Was an appointment offered for this call? No  If yes : Appointment type              Date    Preferred method for responding to this message: Telephone Call  What is your phone number ?  [phone]     If we cannot reach you directly, may we leave a detailed response at the number you provided? Yes    Can this message wait until your PCP/provider returns, if unavailable today? Not applicable    Ashlee Ramirez

## 2023-10-09 ENCOUNTER — OFFICE VISIT (OUTPATIENT)
Dept: FAMILY MEDICINE | Facility: OTHER | Age: 73
End: 2023-10-09
Attending: FAMILY MEDICINE
Payer: COMMERCIAL

## 2023-10-09 VITALS
HEART RATE: 61 BPM | SYSTOLIC BLOOD PRESSURE: 115 MMHG | DIASTOLIC BLOOD PRESSURE: 61 MMHG | TEMPERATURE: 97.4 F | WEIGHT: 141.9 LBS | OXYGEN SATURATION: 97 % | BODY MASS INDEX: 23.61 KG/M2
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
## 2023-10-09 DIAGNOSIS — R05.2 SUBACUTE COUGH: Primary | ICD-10-CM

## 2023-10-09 DIAGNOSIS — S70.361A TICK BITE OF RIGHT THIGH, INITIAL ENCOUNTER: ICD-10-CM

## 2023-10-09 PROCEDURE — 99213 OFFICE O/P EST LOW 20 MIN: CPT | Performed by: FAMILY MEDICINE

## 2023-10-09 PROCEDURE — G0463 HOSPITAL OUTPT CLINIC VISIT: HCPCS

## 2023-10-09 ASSESSMENT — PAIN SCALES - GENERAL: PAINLEVEL: NO PAIN (0)

## 2023-10-09 NOTE — PROGRESS NOTES
Assessment & Plan     Subacute cough  Improved.  Finish out the augmentin     Tick bite of right thigh, initial encounter  Deer tick engorged possibly on less than 24 hours but not sure.  Test in a couple weeks to verify.    - Lyme Disease Total Abs Bld with Reflex to Confirm CLIA; Future                 No follow-ups on file.    Jose Guadalupe Cotton MD  [facility]    Guillaume Ghosh is a 72 year old, presenting for the following health issues:  Cough        10/9/2023     1:03 PM   Additional Questions   Roomed by Tony Garcia   Accompanied by None         10/9/2023     1:03 PM   Patient Reported Additional Medications   Patient reports taking the following new medications None       HPI     Medication Followup of Claritin  Taking Medication as prescribed: yes  Side Effects:  Upset stomach with mild cramping   Medication Helping Symptoms:  Helps with the cough for sure       Concern - Tick bite upper right back of thigh  Onset: 10/4/2023  Description: Tick bite on right upper thigh from hunting last week  Progression of Symptoms:  worsening  Accompanying Signs & Symptoms: Itching and redness   Previous history of similar problem: None  Alleviating factors:        Improved by: Itching it   Therapies tried and outcome: None         Review of Systems   Constitutional, HEENT, cardiovascular, pulmonary, gi and gu systems are negative, except as otherwise noted.      Objective    /61 (BP Location: Left arm, Patient Position: Sitting, Cuff Size: Adult Regular)   Pulse 61   Temp 97.4  F (36.3  C) (Tympanic)   Wt 64.4 kg (141 lb 14.4 oz)   SpO2 97%   BMI 23.61 kg/m    Body mass index is 23.61 kg/m .  Physical Exam   GENERAL: healthy, alert and no distress  NECK: no adenopathy, no asymmetry, masses, or scars and thyroid normal to palpation  RESP: lungs clear to auscultation - no rales, rhonchi or wheezes  CV: regular rate and rhythm, normal S1 S2, no S3 or S4, no murmur, click or rub,  no peripheral edema and peripheral pulses strong  ABDOMEN: soft, nontender, no hepatosplenomegaly, no masses and bowel sounds normal  MS: no gross musculoskeletal defects noted, no edema  SKIN: tick bite site with erythema and no EM rash present.  Scabbed over center.

## 2023-10-31 ENCOUNTER — LAB (OUTPATIENT)
Dept: LAB | Facility: OTHER | Age: 73
End: 2023-10-31
Payer: COMMERCIAL
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
## 2023-10-31 DIAGNOSIS — S70.361A TICK BITE OF RIGHT THIGH, INITIAL ENCOUNTER: ICD-10-CM

## 2023-10-31 PROCEDURE — 36415 COLL VENOUS BLD VENIPUNCTURE: CPT | Mod: ZL

## 2023-10-31 PROCEDURE — 86618 LYME DISEASE ANTIBODY: CPT | Mod: ZL

## 2023-11-01 LAB — B BURGDOR IGG+IGM SER QL: 0.06

## 2023-11-01 NOTE — PROGRESS NOTES
11/01/23 0500   Appointment Info   Signing clinician's name / credentials Shawna George, PT   PT Goal 1   Goal Identifier STG 1   Goal Description Pt will be independent with HEP for flexibility and strengthening without increased discomfort.   Rationale to maximize safety and independence with performance of ADLs and functional tasks   Goal Progress Great progress, goal met.   Target Date 09/01/23   Date Met 08/29/23   PT Goal 2   Goal Identifier STG 2   Goal Description Pt will report decreased pain and soreness in R shoulder/neck by 25% or greater.   Rationale to maximize safety and independence with performance of ADLs and functional tasks   Target Date 09/01/23   Goal Progress Great progress, goal met.   Date Met 08/29/23   PT Goal 3   Goal Identifier LTG 1   Goal Description Pt will demonstrate improved cervical ROM in all planes by 25%.   Rationale to maximize safety and independence with performance of ADLs and functional tasks   Target Date 09/29/23   Goal Progress   (Goal Met.)   Date Met 08/29/23   PT Goal 4   Goal Identifier LTG 2   Goal Description Pt will report improved pain with decreased muscle tightness in R shoulder/neck area back to baseline in order to resume all previous activities without increased pain.   Rationale to maximize safety and independence with performance of ADLs and functional tasks   Target Date 09/29/23   Goal Progress Great progress, goal met.   Date Met 08/29/23   Comments   Comments Pt canceled last scheduled PT visit due to feeling good and not needing to come in.  He has met all of his goals above and is independent with his HEP.  PT is discharged from skilled PT at this time.         DISCHARGE  Reason for Discharge: Patient has met all goals.    Discharge Plan: Patient to continue home program.    Referring Provider:  Abril Ramires

## 2023-12-10 ENCOUNTER — HEALTH MAINTENANCE LETTER (OUTPATIENT)
Age: 73
End: 2023-12-10

## 2024-01-23 ENCOUNTER — TELEPHONE (OUTPATIENT)
Dept: FAMILY MEDICINE | Facility: OTHER | Age: 74
End: 2024-01-23

## 2024-01-23 NOTE — TELEPHONE ENCOUNTER
3:07 PM    Reason for Call: OVERBOOK    Patient is having the following symptoms: stomach pain    The patient is requesting an appointment for Kenny with Dr Cotton.    Was an appointment offered for this call? No  If yes : Appointment type              Date    Preferred method for responding to this message: Telephone Call  What is your phone number ?[phone]    If we cannot reach you directly, may we leave a detailed response at the number you provided? Yes    Can this message wait until your PCP/provider returns, if unavailable today? Not applicable    Keshia Russ

## 2024-01-25 ENCOUNTER — OFFICE VISIT (OUTPATIENT)
Dept: FAMILY MEDICINE | Facility: OTHER | Age: 74
End: 2024-01-25
Attending: FAMILY MEDICINE
Payer: COMMERCIAL

## 2024-01-25 ENCOUNTER — ANCILLARY PROCEDURE (OUTPATIENT)
Dept: GENERAL RADIOLOGY | Facility: OTHER | Age: 74
End: 2024-01-25
Attending: FAMILY MEDICINE
Payer: COMMERCIAL

## 2024-01-25 ENCOUNTER — APPOINTMENT (OUTPATIENT)
Dept: LAB | Facility: OTHER | Age: 74
End: 2024-01-25
Attending: FAMILY MEDICINE
Payer: COMMERCIAL

## 2024-01-25 VITALS
HEART RATE: 59 BPM | BODY MASS INDEX: 22.96 KG/M2 | WEIGHT: 138 LBS | TEMPERATURE: 97 F | OXYGEN SATURATION: 98 % | DIASTOLIC BLOOD PRESSURE: 62 MMHG | SYSTOLIC BLOOD PRESSURE: 104 MMHG

## 2024-01-25 DIAGNOSIS — C61 PROSTATE CANCER (H): ICD-10-CM

## 2024-01-25 DIAGNOSIS — R19.7 DIARRHEA, UNSPECIFIED TYPE: Primary | ICD-10-CM

## 2024-01-25 DIAGNOSIS — R19.7 DIARRHEA, UNSPECIFIED TYPE: ICD-10-CM

## 2024-01-25 LAB
ADV 40+41 DNA STL QL NAA+NON-PROBE: NEGATIVE
ALBUMIN SERPL BCG-MCNC: 4.2 G/DL (ref 3.5–5.2)
ALP SERPL-CCNC: 55 U/L (ref 40–150)
ALT SERPL W P-5'-P-CCNC: 15 U/L (ref 0–70)
ANION GAP SERPL CALCULATED.3IONS-SCNC: 9 MMOL/L (ref 7–15)
AST SERPL W P-5'-P-CCNC: 23 U/L (ref 0–45)
ASTRO TYP 1-8 RNA STL QL NAA+NON-PROBE: NEGATIVE
BASOPHILS # BLD AUTO: 0 10E3/UL (ref 0–0.2)
BASOPHILS NFR BLD AUTO: 0 %
BILIRUB SERPL-MCNC: 0.5 MG/DL
BUN SERPL-MCNC: 17.9 MG/DL (ref 8–23)
C CAYETANENSIS DNA STL QL NAA+NON-PROBE: NEGATIVE
CALCIUM SERPL-MCNC: 8.9 MG/DL (ref 8.8–10.2)
CAMPYLOBACTER DNA SPEC NAA+PROBE: NEGATIVE
CHLORIDE SERPL-SCNC: 101 MMOL/L (ref 98–107)
CREAT SERPL-MCNC: 0.9 MG/DL (ref 0.67–1.17)
CRYPTOSP DNA STL QL NAA+NON-PROBE: NEGATIVE
DEPRECATED HCO3 PLAS-SCNC: 26 MMOL/L (ref 22–29)
E COLI O157 DNA STL QL NAA+NON-PROBE: NORMAL
E HISTOLYT DNA STL QL NAA+NON-PROBE: NEGATIVE
EAEC ASTA GENE ISLT QL NAA+PROBE: NEGATIVE
EC STX1+STX2 GENES STL QL NAA+NON-PROBE: NEGATIVE
EGFRCR SERPLBLD CKD-EPI 2021: >90 ML/MIN/1.73M2
EOSINOPHIL # BLD AUTO: 0.1 10E3/UL (ref 0–0.7)
EOSINOPHIL NFR BLD AUTO: 2 %
EPEC EAE GENE STL QL NAA+NON-PROBE: NEGATIVE
ERYTHROCYTE [DISTWIDTH] IN BLOOD BY AUTOMATED COUNT: 12.8 % (ref 10–15)
ETEC LTA+ST1A+ST1B TOX ST NAA+NON-PROBE: NEGATIVE
G LAMBLIA DNA STL QL NAA+NON-PROBE: NEGATIVE
GLUCOSE SERPL-MCNC: 94 MG/DL (ref 70–99)
HCT VFR BLD AUTO: 43.9 % (ref 40–53)
HGB BLD-MCNC: 15.1 G/DL (ref 13.3–17.7)
IMM GRANULOCYTES # BLD: 0 10E3/UL
IMM GRANULOCYTES NFR BLD: 0 %
LYMPHOCYTES # BLD AUTO: 1.2 10E3/UL (ref 0.8–5.3)
LYMPHOCYTES NFR BLD AUTO: 20 %
MCH RBC QN AUTO: 29.8 PG (ref 26.5–33)
MCHC RBC AUTO-ENTMCNC: 34.4 G/DL (ref 31.5–36.5)
MCV RBC AUTO: 87 FL (ref 78–100)
MONOCYTES # BLD AUTO: 0.5 10E3/UL (ref 0–1.3)
MONOCYTES NFR BLD AUTO: 9 %
NEUTROPHILS # BLD AUTO: 4 10E3/UL (ref 1.6–8.3)
NEUTROPHILS NFR BLD AUTO: 68 %
NOROVIRUS GI+II RNA STL QL NAA+NON-PROBE: NEGATIVE
P SHIGELLOIDES DNA STL QL NAA+NON-PROBE: NEGATIVE
PLATELET # BLD AUTO: 209 10E3/UL (ref 150–450)
POTASSIUM SERPL-SCNC: 4.4 MMOL/L (ref 3.4–5.3)
PROT SERPL-MCNC: 6.5 G/DL (ref 6.4–8.3)
PSA SERPL DL<=0.01 NG/ML-MCNC: 2.82 NG/ML (ref 0–6.5)
RBC # BLD AUTO: 5.07 10E6/UL (ref 4.4–5.9)
RVA RNA STL QL NAA+NON-PROBE: NEGATIVE
SALMONELLA SP RPOD STL QL NAA+PROBE: NEGATIVE
SAPO I+II+IV+V RNA STL QL NAA+NON-PROBE: NEGATIVE
SHIGELLA SP+EIEC IPAH ST NAA+NON-PROBE: NEGATIVE
SODIUM SERPL-SCNC: 136 MMOL/L (ref 135–145)
V CHOLERAE DNA SPEC QL NAA+PROBE: NEGATIVE
VIBRIO DNA SPEC NAA+PROBE: NEGATIVE
WBC # BLD AUTO: 5.8 10E3/UL (ref 4–11)
Y ENTEROCOL DNA STL QL NAA+PROBE: NEGATIVE

## 2024-01-25 PROCEDURE — 36415 COLL VENOUS BLD VENIPUNCTURE: CPT | Mod: ZL | Performed by: FAMILY MEDICINE

## 2024-01-25 PROCEDURE — 82040 ASSAY OF SERUM ALBUMIN: CPT | Mod: ZL | Performed by: FAMILY MEDICINE

## 2024-01-25 PROCEDURE — G0463 HOSPITAL OUTPT CLINIC VISIT: HCPCS

## 2024-01-25 PROCEDURE — 87507 IADNA-DNA/RNA PROBE TQ 12-25: CPT | Mod: ZL | Performed by: FAMILY MEDICINE

## 2024-01-25 PROCEDURE — 84153 ASSAY OF PSA TOTAL: CPT | Mod: ZL | Performed by: FAMILY MEDICINE

## 2024-01-25 PROCEDURE — 99213 OFFICE O/P EST LOW 20 MIN: CPT | Performed by: FAMILY MEDICINE

## 2024-01-25 PROCEDURE — 85025 COMPLETE CBC W/AUTO DIFF WBC: CPT | Mod: ZL | Performed by: FAMILY MEDICINE

## 2024-01-25 PROCEDURE — 74018 RADEX ABDOMEN 1 VIEW: CPT | Mod: TC

## 2024-01-25 RX ORDER — SACCHAROMYCES BOULARDII 250 MG
250 CAPSULE ORAL 2 TIMES DAILY
Qty: 60 CAPSULE | Refills: 1 | Status: SHIPPED | OUTPATIENT
Start: 2024-01-25 | End: 2024-07-30

## 2024-01-25 ASSESSMENT — PAIN SCALES - GENERAL: PAINLEVEL: NO PAIN (0)

## 2024-01-25 NOTE — PROGRESS NOTES
Assessment & Plan     Diarrhea, unspecified type  Lengthy review.  Stool studies, blood work, and abd xray.  Consider an overflow diarrhea type picture but quite watery this last week or so.  Start florastor while we work to figure this out.  Xray and labs pending.  If lots of stool/gas on the xray look at mag citrate trial and see if cleaning him out some can solve this.  All of this is discussed at length and Chris understands.    - Enteric Bacteria and Virus Panel by JAYDEN Stool; Future  - CBC with platelets and differential; Future  - Comprehensive metabolic panel (BMP + Alb, Alk Phos, ALT, AST, Total. Bili, TP); Future  - saccharomyces boulardii (FLORASTOR) 250 MG capsule; Take 1 capsule (250 mg) by mouth 2 times daily  - XR ABDOMEN 1 VIEW (Clinic Performed); Future  - Enteric Bacteria and Virus Panel by JAYDEN Stool  - CBC with platelets and differential  - Comprehensive metabolic panel (BMP + Alb, Alk Phos, ALT, AST, Total. Bili, TP)                No follow-ups on file.    Guillaume Ghosh is a 73 year old, presenting for the following health issues:  Diarrhea    HPI     Diarrhea    Duration: months   Description:       Consistency of stool: watery and loose       Blood in stool: no        Number of loose stools past 24 hours: 6-8  Intensity:  moderate  Accompanying signs and symptoms:  gas and bloating        Fever: no        Nausea/vomitting: no        Abdominal pain: YES- cramping        Weight loss: no   History (recent antibiotics or travel/ill contacts/med changes/testing done): none   Precipitating or alleviating factors: None  Therapies tried and outcome: none              Review of Systems  Constitutional, HEENT, cardiovascular, pulmonary, gi and gu systems are negative, except as otherwise noted.      Objective    /62   Pulse 59   Temp 97  F (36.1  C)   Wt 62.6 kg (138 lb)   SpO2 98%   BMI 22.96 kg/m    Body mass index is 22.96 kg/m .  Physical Exam   GENERAL: alert and no  distress  NECK: no adenopathy, no asymmetry, masses, or scars  RESP: lungs clear to auscultation - no rales, rhonchi or wheezes  CV: regular rate and rhythm, normal S1 S2, no S3 or S4, no murmur, click or rub, no peripheral edema  ABDOMEN: soft, nontender, no hepatosplenomegaly, no masses and bowel sounds normal  MS: no gross musculoskeletal defects noted, no edema    Labs and xray pending.  All will be addressed today.          Signed Electronically by: Jose Guadalupe Cotton MD

## 2024-01-26 ENCOUNTER — APPOINTMENT (OUTPATIENT)
Dept: LAB | Facility: OTHER | Age: 74
End: 2024-01-26
Payer: COMMERCIAL

## 2024-01-26 DIAGNOSIS — R19.7 DIARRHEA, UNSPECIFIED TYPE: Primary | ICD-10-CM

## 2024-01-26 LAB — C DIFF TOX B STL QL: NEGATIVE

## 2024-01-26 PROCEDURE — 87493 C DIFF AMPLIFIED PROBE: CPT | Mod: ZL | Performed by: FAMILY MEDICINE

## 2024-01-26 NOTE — PROGRESS NOTES
Notified by lab of possible C diff on culture. Need new sample to run. C diff ordered. He will need to  a new stool kit. Lab will reach out to notify him.

## 2024-01-31 ENCOUNTER — TELEPHONE (OUTPATIENT)
Dept: FAMILY MEDICINE | Facility: OTHER | Age: 74
End: 2024-01-31

## 2024-02-15 ENCOUNTER — TELEPHONE (OUTPATIENT)
Dept: FAMILY MEDICINE | Facility: OTHER | Age: 74
End: 2024-02-15

## 2024-02-15 NOTE — TELEPHONE ENCOUNTER
Pt states he tried mag citrate, probiotic, and yogurt and states that it has not helped.  He is wondering what the next step is?

## 2024-02-15 NOTE — TELEPHONE ENCOUNTER
11:29 AM    Reason for Call: Phone Call    Description: pt wanting to let Dr Cotton know how he is doing with the treatments for his stomach/intestines    Was an appointment offered for this call? No  If yes : Appointment type              Date    Preferred method for responding to this message: Telephone Call  What is your phone number ?[phone]/cell [phone]    If we cannot reach you directly, may we leave a detailed response at the number you provided? Yes    Can this message wait until your PCP/provider returns, if available today? N/a    Keshia Russ

## 2024-02-15 NOTE — TELEPHONE ENCOUNTER
See me early next week.  I want to reassess and examine and likely refer.  I reviewed the workup and it is all reassuring but I have to try and figure this out again.  Thanks.  Jose Guadalupe Cotton MD

## 2024-02-20 NOTE — PROGRESS NOTES
Assessment & Plan     Frequent stools  Reviewed at length today.  Extensive conversation about possible causes, etc.  I still favor constipation but he tried the clean out and not much out and ongoing sx.  He has adjusted diet, etc.  He is going to restart the citrucel and focus on hydration along with trial of mag citrate.  Us the gallbladder with the cramping and asking gi to see as I am just not figuring this out confidently.    - XR ABDOMEN 1 VIEW (Clinic Performed); Future  - US Abdomen Limited; Future  - Adult GI  Referral - Consult Only; Future      40 minutes spent by me on the date of the encounter doing chart review, patient visit, and documentation         Xray ongoing mild stool burden overall without significant findings.      No follow-ups on file.    Guillaume Ghosh is a 73 year old, presenting for the following health issues:  Constipation    HPI     Constipation    Duration: ongoing   Description:       Frequency of bowel movements: 8-10 times daily        Consistency of stool: loose stools  Intensity:  moderate  Accompanying signs and symptoms: urgency        Abdominal pain: no        Rectal pain: no        Blood in stool: no        Nausea/vomitting: no   History:        Similar problems in past: YES  Precipitating or alleviating factors: worse after eating        Medications worsening symptoms: no   Therapies tried and outcome: mag citrate, probiotics           Review of Systems  Constitutional, HEENT, cardiovascular, pulmonary, gi and gu systems are negative, except as otherwise noted.      Objective    /62   Pulse 60   Temp 97.3  F (36.3  C) (Tympanic)   Wt 63.5 kg (140 lb)   SpO2 95%   BMI 23.30 kg/m    Body mass index is 23.3 kg/m .  Physical Exam   GENERAL: alert and no distress  NECK: no adenopathy, no asymmetry, masses, or scars  RESP: lungs clear to auscultation - no rales, rhonchi or wheezes  CV: regular rate and rhythm, normal S1 S2, no S3 or S4, no murmur,  click or rub, no peripheral edema  ABDOMEN: soft, nontender, no hepatosplenomegaly, no masses and bowel sounds normal  MS: no gross musculoskeletal defects noted, no edema    Xray reviewed with him.          Signed Electronically by: Jose Guadalupe Cotton MD

## 2024-02-22 ENCOUNTER — ANCILLARY PROCEDURE (OUTPATIENT)
Dept: GENERAL RADIOLOGY | Facility: OTHER | Age: 74
End: 2024-02-22
Attending: FAMILY MEDICINE
Payer: COMMERCIAL

## 2024-02-22 ENCOUNTER — OFFICE VISIT (OUTPATIENT)
Dept: FAMILY MEDICINE | Facility: OTHER | Age: 74
End: 2024-02-22
Attending: FAMILY MEDICINE
Payer: COMMERCIAL

## 2024-02-22 VITALS
WEIGHT: 140 LBS | OXYGEN SATURATION: 95 % | SYSTOLIC BLOOD PRESSURE: 114 MMHG | BODY MASS INDEX: 23.3 KG/M2 | DIASTOLIC BLOOD PRESSURE: 62 MMHG | HEART RATE: 60 BPM | TEMPERATURE: 97.3 F

## 2024-02-22 DIAGNOSIS — K52.9 FREQUENT STOOLS: ICD-10-CM

## 2024-02-22 DIAGNOSIS — K52.9 FREQUENT STOOLS: Primary | ICD-10-CM

## 2024-02-22 PROCEDURE — 74018 RADEX ABDOMEN 1 VIEW: CPT | Mod: TC,FY

## 2024-02-22 PROCEDURE — 99215 OFFICE O/P EST HI 40 MIN: CPT | Performed by: FAMILY MEDICINE

## 2024-02-22 PROCEDURE — G0463 HOSPITAL OUTPT CLINIC VISIT: HCPCS

## 2024-02-22 ASSESSMENT — PAIN SCALES - GENERAL: PAINLEVEL: NO PAIN (0)

## 2024-02-28 ENCOUNTER — HOSPITAL ENCOUNTER (OUTPATIENT)
Dept: ULTRASOUND IMAGING | Facility: HOSPITAL | Age: 74
Discharge: HOME | End: 2024-02-28
Attending: FAMILY MEDICINE | Admitting: FAMILY MEDICINE
Payer: COMMERCIAL

## 2024-02-28 DIAGNOSIS — K52.9 FREQUENT STOOLS: ICD-10-CM

## 2024-02-28 PROCEDURE — 76705 ECHO EXAM OF ABDOMEN: CPT

## 2024-02-29 DIAGNOSIS — K52.9 FREQUENT STOOLS: Primary | ICD-10-CM

## 2024-03-12 ENCOUNTER — TELEPHONE (OUTPATIENT)
Dept: FAMILY MEDICINE | Facility: OTHER | Age: 74
End: 2024-03-12

## 2024-03-12 NOTE — TELEPHONE ENCOUNTER
Attempt # 1  Outcome: Left Message   Comment: LVM to schedule an physical/medicare wellness exam per quality list

## 2024-05-14 ENCOUNTER — TELEPHONE (OUTPATIENT)
Dept: FAMILY MEDICINE | Facility: OTHER | Age: 74
End: 2024-05-14

## 2024-05-14 NOTE — TELEPHONE ENCOUNTER
10:53 AM    Reason for Call: Phone Call    Description: I rescheduled Chris's physical , but he is wondering if Dr. Cotton heard anything from [facility] about ordering a colonoscopy. They told him he needed one but no one has contacted him to schedule. I did transfer to [facility] to  if they received one from [facility] . I don't see that there is one scheduled. Please call Chris to advise.     Was an appointment offered for this call? No  If yes : Appointment type              Date    Preferred method for responding to this message: Telephone Call  What is your phone number ?  [phone]     If we cannot reach you directly, may we leave a detailed response at the number you provided? Yes    Can this message wait until your PCP/provider returns, if available today? YES    Ashlee Ramirze

## 2024-05-16 NOTE — TELEPHONE ENCOUNTER
Pt states [facility] wanted him to have a colonoscopy.  Explained that they will need to put in a referral to have it done in [place].   He will call them back for referral.

## 2024-05-21 ENCOUNTER — TELEPHONE (OUTPATIENT)
Dept: FAMILY MEDICINE | Facility: OTHER | Age: 74
End: 2024-05-21

## 2024-05-21 NOTE — TELEPHONE ENCOUNTER
4:26 PM    Reason for Call: Phone Call    Description: pt is having trouble scheduling a colonoscopy    Was an appointment offered for this call? No  If yes : Appointment type              Date    Preferred method for responding to this message: Telephone Call  What is your phone number ?[phone]     If we cannot reach you directly, may we leave a detailed response at the number you provided? Yes    Can this message wait until your PCP/provider returns, if available today? Not applicable    Keshia Russ

## 2024-05-22 NOTE — TELEPHONE ENCOUNTER
Pt will call [facility] for referral to [place] for colonoscopy.

## 2024-07-30 ENCOUNTER — OFFICE VISIT (OUTPATIENT)
Dept: FAMILY MEDICINE | Facility: OTHER | Age: 74
End: 2024-07-30
Attending: FAMILY MEDICINE
Payer: COMMERCIAL

## 2024-07-30 VITALS
DIASTOLIC BLOOD PRESSURE: 64 MMHG | TEMPERATURE: 97 F | OXYGEN SATURATION: 98 % | HEART RATE: 59 BPM | HEIGHT: 65 IN | SYSTOLIC BLOOD PRESSURE: 118 MMHG | WEIGHT: 130 LBS | BODY MASS INDEX: 21.66 KG/M2

## 2024-07-30 DIAGNOSIS — Z13.220 LIPID SCREENING: ICD-10-CM

## 2024-07-30 DIAGNOSIS — Z00.00 MEDICARE ANNUAL WELLNESS VISIT, SUBSEQUENT: Primary | ICD-10-CM

## 2024-07-30 DIAGNOSIS — J32.9 SINUSITIS, UNSPECIFIED CHRONICITY, UNSPECIFIED LOCATION: ICD-10-CM

## 2024-07-30 DIAGNOSIS — R19.7 DIARRHEA, UNSPECIFIED TYPE: ICD-10-CM

## 2024-07-30 DIAGNOSIS — C61 PROSTATE CANCER (H): ICD-10-CM

## 2024-07-30 LAB
ALBUMIN SERPL BCG-MCNC: 3.8 G/DL (ref 3.5–5.2)
ALP SERPL-CCNC: 61 U/L (ref 40–150)
ALT SERPL W P-5'-P-CCNC: 9 U/L (ref 0–70)
ANION GAP SERPL CALCULATED.3IONS-SCNC: 10 MMOL/L (ref 7–15)
AST SERPL W P-5'-P-CCNC: 18 U/L (ref 0–45)
BILIRUB SERPL-MCNC: 0.3 MG/DL
BUN SERPL-MCNC: 19.2 MG/DL (ref 8–23)
CALCIUM SERPL-MCNC: 9 MG/DL (ref 8.8–10.4)
CHLORIDE SERPL-SCNC: 104 MMOL/L (ref 98–107)
CHOLEST SERPL-MCNC: 139 MG/DL
CREAT SERPL-MCNC: 0.8 MG/DL (ref 0.67–1.17)
EGFRCR SERPLBLD CKD-EPI 2021: >90 ML/MIN/1.73M2
FASTING STATUS PATIENT QL REPORTED: YES
FASTING STATUS PATIENT QL REPORTED: YES
GLUCOSE SERPL-MCNC: 68 MG/DL (ref 70–99)
HCO3 SERPL-SCNC: 25 MMOL/L (ref 22–29)
HDLC SERPL-MCNC: 50 MG/DL
LDLC SERPL CALC-MCNC: 78 MG/DL
NONHDLC SERPL-MCNC: 89 MG/DL
POTASSIUM SERPL-SCNC: 4.7 MMOL/L (ref 3.4–5.3)
PROT SERPL-MCNC: 6.7 G/DL (ref 6.4–8.3)
SODIUM SERPL-SCNC: 139 MMOL/L (ref 135–145)
TRIGL SERPL-MCNC: 57 MG/DL

## 2024-07-30 PROCEDURE — 84155 ASSAY OF PROTEIN SERUM: CPT | Mod: ZL | Performed by: FAMILY MEDICINE

## 2024-07-30 PROCEDURE — G0439 PPPS, SUBSEQ VISIT: HCPCS | Performed by: FAMILY MEDICINE

## 2024-07-30 PROCEDURE — 36415 COLL VENOUS BLD VENIPUNCTURE: CPT | Mod: ZL | Performed by: FAMILY MEDICINE

## 2024-07-30 PROCEDURE — 83718 ASSAY OF LIPOPROTEIN: CPT | Mod: ZL | Performed by: FAMILY MEDICINE

## 2024-07-30 PROCEDURE — G0463 HOSPITAL OUTPT CLINIC VISIT: HCPCS

## 2024-07-30 PROCEDURE — 99213 OFFICE O/P EST LOW 20 MIN: CPT | Mod: 25 | Performed by: FAMILY MEDICINE

## 2024-07-30 PROCEDURE — 82040 ASSAY OF SERUM ALBUMIN: CPT | Mod: ZL | Performed by: FAMILY MEDICINE

## 2024-07-30 SDOH — HEALTH STABILITY: PHYSICAL HEALTH: ON AVERAGE, HOW MANY DAYS PER WEEK DO YOU ENGAGE IN MODERATE TO STRENUOUS EXERCISE (LIKE A BRISK WALK)?: 3 DAYS

## 2024-07-30 SDOH — HEALTH STABILITY: PHYSICAL HEALTH: ON AVERAGE, HOW MANY MINUTES DO YOU ENGAGE IN EXERCISE AT THIS LEVEL?: 30 MIN

## 2024-07-30 ASSESSMENT — PAIN SCALES - GENERAL: PAINLEVEL: NO PAIN (0)

## 2024-07-30 ASSESSMENT — SOCIAL DETERMINANTS OF HEALTH (SDOH): HOW OFTEN DO YOU GET TOGETHER WITH FRIENDS OR RELATIVES?: TWICE A WEEK

## 2024-07-30 NOTE — LETTER
July 31, 2024      Augie Hancock  [address]  [place] [address]        Dear ,    We are writing to inform you of your test results.    Great labs recheck annually.  Fantastic.     Resulted Orders   Lipid Profile   Result Value Ref Range    Cholesterol 139 <200 mg/dL    Triglycerides 57 <150 mg/dL    Direct Measure HDL 50 >=40 mg/dL    LDL Cholesterol Calculated 78 <=100 mg/dL    Non HDL Cholesterol 89 <130 mg/dL    Patient Fasting > 8hrs? Yes     Narrative    Cholesterol  Desirable:  <200 mg/dL    Triglycerides  Normal:  Less than 150 mg/dL  Borderline High:  150-199 mg/dL  High:  200-499 mg/dL  Very High:  Greater than or equal to 500 mg/dL    Direct Measure HDL  Female:  Greater than or equal to 50 mg/dL   Male:  Greater than or equal to 40 mg/dL    LDL Cholesterol  Desirable:  <100mg/dL  Above Desirable:  100-129 mg/dL   Borderline High:  130-159 mg/dL   High:  160-189 mg/dL   Very High:  >= 190 mg/dL    Non HDL Cholesterol  Desirable:  130 mg/dL  Above Desirable:  130-159 mg/dL  Borderline High:  160-189 mg/dL  High:  190-219 mg/dL  Very High:  Greater than or equal to 220 mg/dL   Comprehensive metabolic panel   Result Value Ref Range    Sodium 139 135 - 145 mmol/L    Potassium 4.7 3.4 - 5.3 mmol/L    Carbon Dioxide (CO2) 25 22 - 29 mmol/L    Anion Gap 10 7 - 15 mmol/L    Urea Nitrogen 19.2 8.0 - 23.0 mg/dL    Creatinine 0.80 0.67 - 1.17 mg/dL    GFR Estimate >90 >60 mL/min/1.73m2      Comment:      eGFR calculated using 2021 CKD-EPI equation.    Calcium 9.0 8.8 - 10.4 mg/dL      Comment:      Reference intervals for this test were updated on 7/16/2024 to reflect our healthy population more accurately. There may be differences in the flagging of prior results with similar values performed with this method. Those prior results can be interpreted in the context of the updated reference intervals.    Chloride 104 98 - 107 mmol/L    Glucose 68 (L) 70 - 99 mg/dL    Alkaline Phosphatase 61 40 -  150 U/L    AST 18 0 - 45 U/L    ALT 9 0 - 70 U/L    Protein Total 6.7 6.4 - 8.3 g/dL    Albumin 3.8 3.5 - 5.2 g/dL    Bilirubin Total 0.3 <=1.2 mg/dL    Patient Fasting > 8hrs? Yes        If you have any questions or concerns, please call the clinic at the number listed above.       Sincerely,      Jose Guadalupe Cotton MD

## 2024-07-30 NOTE — PROGRESS NOTES
Preventive Care Visit  [facility]  Jose Guadalupe Cotton MD, Family Medicine  Jul 30, 2024      Assessment & Plan     Lipid screening  Update.      Medicare annual wellness visit, subsequent  Doing well.  Routine cares and update.      Prostate cancer (H)  Monitoring psa with urology.  Stable number.     Diarrhea, unspecified type  Ongoing.  Recommended for colonoscopy in [place] but he would like to do here.  Sent to Dr. Flores for his consideration.  Normal labs and failed probiotics.  Consider IBS as well with his wife passing              Counseling  Appropriate preventive services were addressed with this patient via screening, questionnaire, or discussion as appropriate for fall prevention, nutrition, physical activity, Tobacco-use cessation, weight loss and cognition.  Checklist reviewing preventive services available has been given to the patient.  Reviewed patient's diet, addressing concerns and/or questions.   He is at risk for lack of exercise and has been provided with information to increase physical activity for the benefit of his well-being.   He is at risk for psychosocial distress and has been provided with information to reduce risk.   The patient was provided with written information regarding signs of hearing loss.           No follow-ups on file.    Guillaume Ghosh is a 73 year old, presenting for the following:  Wellness Visit        7/30/2024     8:08 AM   Additional Questions   Roomed by Julia   Accompanied by self         Health Care Directive  Patient does not have a Health Care Directive or Living Will: Patient states has Advance Directive and will bring in a copy to clinic.    HPI            7/30/2024   General Health   How would you rate your overall physical health? Good   Feel stress (tense, anxious, or unable to sleep) Only a little      (!) STRESS CONCERN      7/30/2024   Nutrition   Diet: I don't know            7/30/2024   Exercise   Days per week of moderate/strenous exercise 3  days   Average minutes spent exercising at this level 30 min            7/30/2024   Social Factors   Frequency of gathering with friends or relatives Twice a week   Worry food won't last until get money to buy more No   Food not last or not have enough money for food? No   Do you have housing? (Housing is defined as stable permanent housing and does not include staying ouside in a car, in a tent, in an abandoned building, in an overnight shelter, or couch-surfing.) Yes   Are you worried about losing your housing? No   Lack of transportation? No   Unable to get utilities (heat,electricity)? No            7/30/2024   Fall Risk   Fallen 2 or more times in the past year? No   Trouble with walking or balance? No             7/30/2024   Activities of Daily Living- Home Safety   Needs help with the following daily activites None of the above   Safety concerns in the home None of the above            7/30/2024   Dental   Dentist two times every year? Yes            7/30/2024   Hearing Screening   Hearing concerns? (!) I FEEL THAT PEOPLE ARE MUMBLING OR NOT SPEAKING CLEARLY.    (!) IT'S HARD TO FOLLOW A CONVERSATION IN A NOISY RESTAURANT OR CROWDED ROOM.    (!) TROUBLE UNDESTANDING A SPEAKER IN A PUBLIC MEETING OR Jew SERVICE.    (!) TROUBLE UNDERSTANDING SOFT OR WHISPERED SPEECH.       Multiple values from one day are sorted in reverse-chronological order         7/30/2024   Driving Risk Screening   Patient/family members have concerns about driving No            7/30/2024   General Alertness/Fatigue Screening   Have you been more tired than usual lately? No            7/30/2024   Urinary Incontinence Screening   Bothered by leaking urine in past 6 months No            7/30/2024   TB Screening   Were you born outside of the [place]? No            Today's PHQ-2 Score:       7/30/2024     8:12 AM   PHQ-2 ( 1999 [employer])   Q1: Little interest or pleasure in doing things 0   Q2: Feeling down, depressed or hopeless 0   PHQ-2  Score 0   Q1: Little interest or pleasure in doing things Not at all   Q2: Feeling down, depressed or hopeless Not at all   PHQ-2 Score 0           7/30/2024   Substance Use   Alcohol more than 3/day or more than 7/wk Not Applicable   Do you have a current opioid prescription? No   How severe/bad is pain from 1 to 10? 0/10 (No Pain)   Do you use any other substances recreationally? No        Social History     Tobacco Use    Smoking status: Never    Smokeless tobacco: Former     Types: Chew    Tobacco comments:     no passive exposure   Vaping Use    Vaping status: Never Used   Substance Use Topics    Alcohol use: No     Alcohol/week: 0.0 standard drinks of alcohol           7/30/2024   AAA Screening   Family history of Abdominal Aortic Aneurysm (AAA)? No      ASCVD Risk   The 10-year ASCVD risk score (Luis AHUMADA, et al., 2019) is: 15%    Values used to calculate the score:      Age: 73 years      Sex: Male      Is Non- : No      Diabetic: No      Tobacco smoker: No      Systolic Blood Pressure: 118 mmHg      Is BP treated: No      HDL Cholesterol: 66 mg/dL      Total Cholesterol: 195 mg/dL            Reviewed and updated as needed this visit by Provider                    Past Medical History:   Diagnosis Date    Femoral fracture 4/18/2011    OA (osteoarthritis) of knee 4/18/2011    Routine general medical examination at a health care facility 5/7/2007    Tibia/fibula fracture 4/18/2011     Past Surgical History:   Procedure Laterality Date    COLONOSCOPY  12/17/2012    Repeat in 10 years    COLONOSCOPY  2004    COLONOSCOPY N/A 12/15/2022    Procedure: COLONOSCOPY;  Surgeon: Sony Monzon MD;  Location: [facility]    JOINT REPLACEMENT  4/2011    leg fracture repair secondary to trauma      bilateral    RELEASE CARPAL TUNNEL Right 4/7/2016    Procedure: RELEASE CARPAL TUNNEL;  Surgeon: Moise Izaguirre MD;  Location: [facility] OR     Current providers sharing in care for this patient  "include:  Patient Care Team:  Jose Guadalupe Cotton MD as PCP - General  Jose Guadalupe Cotton MD as Assigned PCP  Azael Rain RPH as Pharmacist (Pharmacist)    The following health maintenance items are reviewed in Epic and correct as of today:  Health Maintenance   Topic Date Due    RSV VACCINE (Pregnancy & 60+) (1 - 1-dose 60+ series) Never done    MEDICARE ANNUAL WELLNESS VISIT  10/25/2023    LIPID  10/25/2023    COVID-19 Vaccine (7 - 2023-24 season) 03/16/2024    INFLUENZA VACCINE (1) 09/01/2024    DTAP/TDAP/TD IMMUNIZATION (3 - Td or Tdap) 05/29/2025    FALL RISK ASSESSMENT  07/30/2025    GLUCOSE  01/25/2027    ADVANCE CARE PLANNING  10/25/2027    COLORECTAL CANCER SCREENING  12/15/2032    HEPATITIS C SCREENING  Completed    PHQ-2 (once per calendar year)  Completed    Pneumococcal Vaccine: 65+ Years  Completed    ZOSTER IMMUNIZATION  Completed    IPV IMMUNIZATION  Aged Out    HPV IMMUNIZATION  Aged Out    MENINGITIS IMMUNIZATION  Aged Out    RSV MONOCLONAL ANTIBODY  Aged Out         Review of Systems  CONSTITUTIONAL: NEGATIVE for fever, chills, change in weight  INTEGUMENTARY/SKIN: NEGATIVE for worrisome rashes, moles or lesions  EYES: NEGATIVE for vision changes or irritation  ENT/MOUTH: NEGATIVE for ear, mouth and throat problems  RESP: NEGATIVE for significant cough or SOB  BREAST: NEGATIVE for masses, tenderness or discharge  CV: NEGATIVE for chest pain, palpitations or peripheral edema  GI: NEGATIVE for nausea, abdominal pain, heartburn, or change in bowel habits  : NEGATIVE for frequency, dysuria, or hematuria  MUSCULOSKELETAL: NEGATIVE for significant arthralgias or myalgia  NEURO: NEGATIVE for weakness, dizziness or paresthesias  ENDOCRINE: NEGATIVE for temperature intolerance, skin/hair changes  HEME: NEGATIVE for bleeding problems  PSYCHIATRIC: NEGATIVE for changes in mood or affect     Objective    Exam  /64   Pulse 59   Temp 97  F (36.1  C) (Tympanic)   Ht 1.651 m (5' 5\")   Wt 59 kg (130 " "lb)   SpO2 98%   BMI 21.63 kg/m     Estimated body mass index is 21.63 kg/m  as calculated from the following:    Height as of this encounter: 1.651 m (5' 5\").    Weight as of this encounter: 59 kg (130 lb).    Physical Exam  GENERAL: alert and no distress  EYES: Eyes grossly normal to inspection, PERRL and conjunctivae and sclerae normal  HENT: ear canals and TM's normal, nose and mouth without ulcers or lesions  NECK: no adenopathy, no asymmetry, masses, or scars  RESP: lungs clear to auscultation - no rales, rhonchi or wheezes  CV: regular rate and rhythm, normal S1 S2, no S3 or S4, no murmur, click or rub, no peripheral edema  ABDOMEN: soft, nontender, no hepatosplenomegaly, no masses and bowel sounds normal  MS: no gross musculoskeletal defects noted, no edema  SKIN: no suspicious lesions or rashes  NEURO: Normal strength and tone, mentation intact and speech normal  PSYCH: mentation appears normal, affect normal/bright         7/30/2024   Mini Cog   Clock Draw Score 2 Normal   3 Item Recall 3 objects recalled   Mini Cog Total Score 5                 Signed Electronically by: Jose Guadalupe Cotton MD    "

## 2024-08-06 ENCOUNTER — PREP FOR PROCEDURE (OUTPATIENT)
Dept: SURGERY | Facility: OTHER | Age: 74
End: 2024-08-06

## 2024-08-06 ENCOUNTER — HOSPITAL ENCOUNTER (OUTPATIENT)
Facility: HOSPITAL | Age: 74
End: 2024-08-06
Attending: SURGERY | Admitting: SURGERY
Payer: COMMERCIAL

## 2024-08-06 ENCOUNTER — OFFICE VISIT (OUTPATIENT)
Dept: SURGERY | Facility: OTHER | Age: 74
End: 2024-08-06
Attending: FAMILY MEDICINE
Payer: COMMERCIAL

## 2024-08-06 VITALS
TEMPERATURE: 97 F | OXYGEN SATURATION: 98 % | RESPIRATION RATE: 16 BRPM | SYSTOLIC BLOOD PRESSURE: 112 MMHG | DIASTOLIC BLOOD PRESSURE: 60 MMHG | HEART RATE: 62 BPM

## 2024-08-06 DIAGNOSIS — R19.7 DIARRHEA: Primary | ICD-10-CM

## 2024-08-06 DIAGNOSIS — R19.7 DIARRHEA, UNSPECIFIED TYPE: ICD-10-CM

## 2024-08-06 PROCEDURE — 99213 OFFICE O/P EST LOW 20 MIN: CPT | Performed by: NURSE PRACTITIONER

## 2024-08-06 PROCEDURE — G0463 HOSPITAL OUTPT CLINIC VISIT: HCPCS

## 2024-08-06 RX ORDER — BISACODYL 5 MG/1
5 TABLET, DELAYED RELEASE ORAL DAILY PRN
Qty: 2 TABLET | Refills: 0 | Status: SHIPPED | OUTPATIENT
Start: 2024-08-06

## 2024-08-06 ASSESSMENT — PAIN SCALES - GENERAL: PAINLEVEL: NO PAIN (0)

## 2024-08-06 NOTE — PROGRESS NOTES
CLINIC NOTE - CONSULT  8/6/2024    Patient:Augie Hancock  +  Referring Physician:  Dr. Jose Guadalupe Cotton    Reason for Referral:  diarrhea    This is a 73 year old male with a need of a colonoscopy for  diarrhea .     Personal history of colon cancer : NO   Family history of colon cancer : NO   Personal history of polyps : NO   Family history of polyps : NO   History of Inflammatory Bowel Disease : NO   History of rectal bleeding : NO   Changes in bowel habits : YES   Last colonoscopy : 2022    Past Medical History:  Past Medical History:   Diagnosis Date    Femoral fracture 4/18/2011    OA (osteoarthritis) of knee 4/18/2011    Routine general medical examination at a [facility] 5/7/2007    Tibia/fibula fracture 4/18/2011       Past Surgical History:  Past Surgical History:   Procedure Laterality Date    COLONOSCOPY  12/17/2012    Repeat in 10 years    COLONOSCOPY  2004    COLONOSCOPY N/A 12/15/2022    Procedure: COLONOSCOPY;  Surgeon: Sony Monzon MD;  Location: [facility]    JOINT REPLACEMENT  4/2011    leg fracture repair secondary to trauma      bilateral    RELEASE CARPAL TUNNEL Right 4/7/2016    Procedure: RELEASE CARPAL TUNNEL;  Surgeon: Moise Izaguirre MD;  Location: [facility]       Family History History:  Family History   Problem Relation Age of Onset    Cerebrovascular Disease Father     Heart Disease Father 70        MI; cause of death    Coronary Artery Disease Other     Diabetes Brother         type 2    Diabetes Mother         type 2    Diabetes Sister         type 2    Coronary Artery Disease Sister 64        MI       History of Tobacco Use:  History   Smoking Status    Never   Smokeless Tobacco    Former    Types: Chew       Current Medications:  Current Outpatient Medications   Medication Sig Dispense Refill    albuterol (PROAIR HFA/PROVENTIL HFA/VENTOLIN HFA) 108 (90 Base) MCG/ACT inhaler Inhale 2 puffs into the lungs every 4 hours as needed for shortness of breath, wheezing or  cough 18 g 0    amoxicillin-clavulanate (AUGMENTIN) 875-125 MG tablet Take 1 tablet by mouth 2 times daily for 10 days 20 tablet 0    Ascorbic Acid (VITAMIN C) 500 MG CAPS Take 1 tablet by mouth daily      ASPIRIN PO Take 81 mg by mouth daily      GLUCOSAMINE-CHONDROIT-MSM-C-MN PO Take 2 tablets by mouth daily glucosamin-chond-msm-adali-115HC  Take one by mouth daily      nicotine (COMMIT) 2 MG lozenge Place 1 lozenge (2 mg) inside cheek every hour as needed for smoking cessation (Patient not taking: Reported on 7/30/2024) 168 lozenge 3    nicotine (NICORETTE) 4 MG lozenge Place 1 lozenge (4 mg) inside cheek every hour as needed for smoking cessation (Patient not taking: Reported on 7/30/2024) 168 lozenge 3    UNABLE TO FIND MEDICATION NAME: Turkey tail 2 tabs daily      vitamin D3 (CHOLECALCIFEROL) 2000 units (50 mcg) tablet Take 1 tablet by mouth daily         Allergies:  No Known Allergies    ROS:  Constitutional: positive for weight loss--eating less due to diarrhea  Eyes: negative  Ears, nose, mouth, throat, and face: negative  Respiratory: negative  Cardiovascular: negative  Gastrointestinal: positive for diarrhea  Genitourinary:negative  Integument/breast: negative  Hematologic/lymphatic: negative  Musculoskeletal: negative  Neurological: negative  Behavioral/Psych: positive for chewing tobacco use  Endocrine: negative  Allergic/Immunologic: negative    PHYSICAL EXAM:     Vital signs: /60   Pulse 62   Temp 97  F (36.1  C) (Tympanic)   Resp 16   SpO2 98%    BMI: There is no height or weight on file to calculate BMI.   General: Normal, healthy, cooperative, in no acute distress, alert   Skin: no rashes   Lungs: clear to auscultation   CV: Regular rate and rhythm    Abdominal: non-distended, soft, non-tender to palpation   Extremities: No cyanosis, clubbing or edema noted bilaterally in Upper and Lower Extremities   Neurological: without deficit    ASSESSMENT:      73 year old male with a need of a  colonoscopy for  diarrhea .    PLAN:   A colonoscopy with biopsies will be scheduled.  The procedure with their risks, benefits and alternatives were explained.  Risks include but are not limited to bleeding, perforation, missing lesions, need for additional procedures, reaction to anesthesia.  All the patients questions were answered.  The patient consents to proceed as planned.

## 2024-08-27 ENCOUNTER — TELEPHONE (OUTPATIENT)
Dept: SURGERY | Facility: OTHER | Age: 74
End: 2024-08-27

## 2024-08-27 ENCOUNTER — TELEPHONE (OUTPATIENT)
Dept: FAMILY MEDICINE | Facility: OTHER | Age: 74
End: 2024-08-27

## 2024-08-27 NOTE — CONFIDENTIAL NOTE
August 27, 2024    Patient would like to cancel procedure 9/5 with Dr Monzon, patient states did not want to reschedule.    -Emelyn Sanders on 8/27/2024 at 3:35 PM

## 2024-08-27 NOTE — TELEPHONE ENCOUNTER
2:18 PM    Reason for Call: Phone Call    Description: pt is doing better, would like to talk to Dr Cotton and see if he needs procedure    Was an appointment offered for this call? No  If yes : Appointment type              Date    Preferred method for responding to this message: Telephone Call  What is your phone number ?[phone]    If we cannot reach you directly, may we leave a detailed response at the number you provided? Yes    Can this message wait until your PCP/provider returns, if available today? Not applicable    Keshia Russ

## 2024-08-27 NOTE — TELEPHONE ENCOUNTER
Yes that is ok.  He could temporarily postpone as long as the symptoms are improved.  And ok to go up on that fiber as well.  Thanks.  Jose Guadalupe Cotton MD

## 2024-08-27 NOTE — TELEPHONE ENCOUNTER
Pt states he cut out dairy and started taking metamucil tablets 3 tabs daily.  He states that his urgent diarrhea has gotten better.  He is still going BID.  He is wondering if he should go up to 4 metamucil tabs a day and cancel his colonoscopy?

## 2025-04-06 ENCOUNTER — APPOINTMENT (OUTPATIENT)
Dept: GENERAL RADIOLOGY | Facility: HOSPITAL | Age: 75
End: 2025-04-06
Attending: NURSE PRACTITIONER
Payer: COMMERCIAL

## 2025-04-06 ENCOUNTER — HOSPITAL ENCOUNTER (EMERGENCY)
Facility: HOSPITAL | Age: 75
Discharge: HOME | End: 2025-04-06
Attending: STUDENT IN AN ORGANIZED HEALTH CARE EDUCATION/TRAINING PROGRAM
Payer: COMMERCIAL

## 2025-04-06 VITALS
HEART RATE: 57 BPM | BODY MASS INDEX: 22.87 KG/M2 | TEMPERATURE: 97.4 F | OXYGEN SATURATION: 97 % | DIASTOLIC BLOOD PRESSURE: 75 MMHG | SYSTOLIC BLOOD PRESSURE: 132 MMHG | WEIGHT: 137.46 LBS | RESPIRATION RATE: 16 BRPM

## 2025-04-06 DIAGNOSIS — R07.9 CHEST PAIN, UNSPECIFIED TYPE: ICD-10-CM

## 2025-04-06 LAB
ALBUMIN SERPL BCG-MCNC: 4.2 G/DL (ref 3.5–5.2)
ALP SERPL-CCNC: 61 U/L (ref 40–150)
ALT SERPL W P-5'-P-CCNC: 12 U/L (ref 0–70)
ANION GAP SERPL CALCULATED.3IONS-SCNC: 8 MMOL/L (ref 7–15)
AST SERPL W P-5'-P-CCNC: 16 U/L (ref 0–45)
BILIRUB SERPL-MCNC: 0.5 MG/DL
BUN SERPL-MCNC: 12.6 MG/DL (ref 8–23)
CALCIUM SERPL-MCNC: 8.8 MG/DL (ref 8.8–10.4)
CHLORIDE SERPL-SCNC: 103 MMOL/L (ref 98–107)
CREAT SERPL-MCNC: 0.82 MG/DL (ref 0.67–1.17)
EGFRCR SERPLBLD CKD-EPI 2021: >90 ML/MIN/1.73M2
ERYTHROCYTE [DISTWIDTH] IN BLOOD BY AUTOMATED COUNT: 12.8 % (ref 10–15)
FLUAV RNA SPEC QL NAA+PROBE: NEGATIVE
FLUBV RNA RESP QL NAA+PROBE: NEGATIVE
GLUCOSE SERPL-MCNC: 97 MG/DL (ref 70–99)
HCO3 SERPL-SCNC: 30 MMOL/L (ref 22–29)
HCT VFR BLD AUTO: 43.7 % (ref 40–53)
HGB BLD-MCNC: 15.1 G/DL (ref 13.3–17.7)
HOLD SPECIMEN: NORMAL
MAGNESIUM SERPL-MCNC: 2.3 MG/DL (ref 1.7–2.3)
MCH RBC QN AUTO: 30.9 PG (ref 26.5–33)
MCHC RBC AUTO-ENTMCNC: 34.6 G/DL (ref 31.5–36.5)
MCV RBC AUTO: 89 FL (ref 78–100)
PLATELET # BLD AUTO: 179 10E3/UL (ref 150–450)
POTASSIUM SERPL-SCNC: 4.6 MMOL/L (ref 3.4–5.3)
PROT SERPL-MCNC: 7.1 G/DL (ref 6.4–8.3)
RBC # BLD AUTO: 4.89 10E6/UL (ref 4.4–5.9)
RSV RNA SPEC NAA+PROBE: NEGATIVE
SARS-COV-2 RNA RESP QL NAA+PROBE: NEGATIVE
SODIUM SERPL-SCNC: 141 MMOL/L (ref 135–145)
TROPONIN T SERPL HS-MCNC: 8 NG/L
TROPONIN T SERPL HS-MCNC: 9 NG/L
WBC # BLD AUTO: 4.8 10E3/UL (ref 4–11)

## 2025-04-06 PROCEDURE — 93005 ELECTROCARDIOGRAM TRACING: CPT

## 2025-04-06 PROCEDURE — 93010 ELECTROCARDIOGRAM REPORT: CPT | Performed by: INTERNAL MEDICINE

## 2025-04-06 PROCEDURE — 85027 COMPLETE CBC AUTOMATED: CPT | Performed by: NURSE PRACTITIONER

## 2025-04-06 PROCEDURE — 84295 ASSAY OF SERUM SODIUM: CPT | Performed by: NURSE PRACTITIONER

## 2025-04-06 PROCEDURE — 36415 COLL VENOUS BLD VENIPUNCTURE: CPT | Performed by: NURSE PRACTITIONER

## 2025-04-06 PROCEDURE — 82040 ASSAY OF SERUM ALBUMIN: CPT | Performed by: NURSE PRACTITIONER

## 2025-04-06 PROCEDURE — 71046 X-RAY EXAM CHEST 2 VIEWS: CPT

## 2025-04-06 PROCEDURE — 99285 EMERGENCY DEPT VISIT HI MDM: CPT | Mod: 25

## 2025-04-06 PROCEDURE — 99284 EMERGENCY DEPT VISIT MOD MDM: CPT | Performed by: STUDENT IN AN ORGANIZED HEALTH CARE EDUCATION/TRAINING PROGRAM

## 2025-04-06 PROCEDURE — 84484 ASSAY OF TROPONIN QUANT: CPT | Performed by: NURSE PRACTITIONER

## 2025-04-06 PROCEDURE — 87637 SARSCOV2&INF A&B&RSV AMP PRB: CPT | Performed by: STUDENT IN AN ORGANIZED HEALTH CARE EDUCATION/TRAINING PROGRAM

## 2025-04-06 PROCEDURE — 83735 ASSAY OF MAGNESIUM: CPT | Performed by: NURSE PRACTITIONER

## 2025-04-06 PROCEDURE — 71046 X-RAY EXAM CHEST 2 VIEWS: CPT | Mod: 26 | Performed by: RADIOLOGY

## 2025-04-06 ASSESSMENT — ACTIVITIES OF DAILY LIVING (ADL): ADLS_ACUITY_SCORE: 41

## 2025-04-06 ASSESSMENT — COLUMBIA-SUICIDE SEVERITY RATING SCALE - C-SSRS
1. IN THE PAST MONTH, HAVE YOU WISHED YOU WERE DEAD OR WISHED YOU COULD GO TO SLEEP AND NOT WAKE UP?: NO
2. HAVE YOU ACTUALLY HAD ANY THOUGHTS OF KILLING YOURSELF IN THE PAST MONTH?: NO
6. HAVE YOU EVER DONE ANYTHING, STARTED TO DO ANYTHING, OR PREPARED TO DO ANYTHING TO END YOUR LIFE?: NO

## 2025-04-06 NOTE — DISCHARGE INSTRUCTIONS
Return to the emergency department if worsening symptoms or new concerning symptoms.  Follow-up with your primary care provider in the next week.  Call schedule appointment.  Get your CTA as soon as possible.  I expect and would like this to get done before you can get in to see your primary care provider.

## 2025-04-06 NOTE — ED PROVIDER NOTES
[facility]  ED Provider Note    Chief Complaint   Patient presents with    Shortness of Breath     History:  Augie Hancock is a 74 year old male with no relevant past medical history presents to the emergency department today with about 3 weeks of intermittent left left sided chest pain.  It is not associated with exertion or eating or specific movements.  In fact he cannot find anything that particularly makes it better or worse.  He reported to the nurse that he had a cough for the past week and a half..  Not on chemotherapy not coughing up blood no recent hospitalizations surgeries or long travel.  Not a ripping tearing sensation of the chest going into the back.  Did not start after major vomiting episode.  No other complaint    Review of Systems   Performed; see HPI for pertinent positives and negatives.     Medical history, surgical history, and social history was reviewed.  Nursing documentation, triage note, and vitals were reviewed.    Vitals:  BP: (!) 149/80  Pulse: 60  Temp: 97.3  F (36.3  C)  Resp: 16  Weight: 62.4 kg (137 lb 7.3 oz)  SpO2: 96 %    Physical Exam:  Constitutional: Alert and conversant. NAD   HENT: NCAT   Eyes: Normal pupils   Neck: supple   CV: No pallor, regular rate and rhythm, no murmurs clicks or rubs  Pulmonary/Chest: Non-labored respirations.  Clear to auscultation bilaterally, no recent producible chest wall tenderness  Abdominal: non-distended   MSK: BRISCOE.   Neuro: Alert and appropriate   Skin: Warm and dry. No diaphoresis. No rashes on exposed skin    Psych: Appropriate mood and affect       MDM:      ED Course as of 04/06/25 1501   Sun Apr 06, 2025   1113 Labs and chest x-ray order.   1121 EKG 12-lead, tracing only  No acute findings on ECG.   1457 Augie Hancock is a 74 year old male presenting with chest pain.    Differential includes but is not limited to acute coronary syndrome, pulmonary embolism, pneumonia, aortic dissection, pneumothorax, GERD,  pericarditis, myocarditis, MSK/costochondritis, shingles.    Vitals wnl   Exam reassuring, no symtpoms now   EKG without signs of acute ischemia or arrhythmia on my independent exam  CXR without new acute concerning findings, no evidence of pneumothorax, pneumonia, pleural effusions, rib fractures or other findings to explain the patient's presentation  Labs all essentially within normal limits and reassuring, serial troponins negative  HEART Score less than 4    History and exam suggest a low likelihood of pulmonary embolism, aortic dissection, or pulmonary pathology as the etiology of this patient's pain.      Given the patient's few risk factors and atypical history for acute coronary syndrome with studies results suggesting low suspicion of a coronary event, the patient is stable for outpatient management. The etiology of the chest pain is unclear possibly musculoskeletal or inflammatory, but with the patient essentially asymptomatic at this time it is a little difficult to tell. Given the nature of the patient's symptoms a and stress test within the last 4 years negative per patient, I think reasonable for CTA coronaries       Procedures:  Procedures        Impression:  Final diagnoses:   Chest pain, unspecified type            Neri Harrison MD  04/06/25 4713

## 2025-04-06 NOTE — ED TRIAGE NOTES
Pt presents with a cough for the past week and a half and some SOB. Pt is concerned as he has an extensive family cardiac history but he has never had any cardiac events.

## 2025-04-07 ENCOUNTER — TELEPHONE (OUTPATIENT)
Dept: FAMILY MEDICINE | Facility: OTHER | Age: 75
End: 2025-04-07

## 2025-04-07 NOTE — TELEPHONE ENCOUNTER
2:08 PM    Reason for Call: OVERBOOK    Patient needing an ER follow up / [facility] / (Pt was scheduled for 4-14 but wants to wait till after angiogram)    The patient is requesting an appointment for wants after 4-23 because pt is having Angiogram on 4-23 and would like to talk to Dr. Cotton after that. Pt was scheduled for 4-14 but wants to wait till after angiogram    Was an appointment offered for this call? No  If yes : Appointment type              Date    Preferred method for responding to this message: Telephone Call  What is your phone number ? [phone]     If we cannot reach you directly, may we leave a detailed response at the number you provided? Yes    Can this message wait until your PCP/provider returns, if unavailable today? Karoline Bhagat

## 2025-04-08 LAB
ATRIAL RATE - MUSE: 58 BPM
DIASTOLIC BLOOD PRESSURE - MUSE: NORMAL MMHG
INTERPRETATION ECG - MUSE: NORMAL
P AXIS - MUSE: NORMAL DEGREES
PR INTERVAL - MUSE: 136 MS
QRS DURATION - MUSE: 92 MS
QT - MUSE: 418 MS
QTC - MUSE: 410 MS
R AXIS - MUSE: -31 DEGREES
SYSTOLIC BLOOD PRESSURE - MUSE: NORMAL MMHG
T AXIS - MUSE: 58 DEGREES
VENTRICULAR RATE- MUSE: 58 BPM

## 2025-04-22 ENCOUNTER — TELEPHONE (OUTPATIENT)
Dept: INTERVENTIONAL RADIOLOGY/VASCULAR | Facility: HOSPITAL | Age: 75
End: 2025-04-22

## 2025-04-22 RX ORDER — METOPROLOL TARTRATE 1 MG/ML
5-15 INJECTION, SOLUTION INTRAVENOUS
Status: CANCELLED | OUTPATIENT
Start: 2025-04-22

## 2025-04-22 RX ORDER — METOPROLOL TARTRATE 25 MG/1
25-100 TABLET, FILM COATED ORAL
Status: CANCELLED | OUTPATIENT
Start: 2025-04-22

## 2025-04-22 RX ORDER — NITROGLYCERIN 0.4 MG/1
0.4 TABLET SUBLINGUAL
Status: CANCELLED | OUTPATIENT
Start: 2025-04-22

## 2025-04-22 RX ORDER — ONDANSETRON 2 MG/ML
4 INJECTION INTRAMUSCULAR; INTRAVENOUS
Status: CANCELLED | OUTPATIENT
Start: 2025-04-22

## 2025-04-22 RX ORDER — LIDOCAINE 40 MG/G
CREAM TOPICAL
Status: CANCELLED | OUTPATIENT
Start: 2025-04-22

## 2025-04-23 ENCOUNTER — HOSPITAL ENCOUNTER (OUTPATIENT)
Dept: CT IMAGING | Facility: HOSPITAL | Age: 75
Discharge: HOME | End: 2025-04-23
Attending: STUDENT IN AN ORGANIZED HEALTH CARE EDUCATION/TRAINING PROGRAM
Payer: COMMERCIAL

## 2025-04-23 ENCOUNTER — HOSPITAL ENCOUNTER (OUTPATIENT)
Facility: HOSPITAL | Age: 75
Discharge: HOME | End: 2025-04-23
Attending: RADIOLOGY | Admitting: RADIOLOGY
Payer: COMMERCIAL

## 2025-04-23 VITALS — OXYGEN SATURATION: 94 % | SYSTOLIC BLOOD PRESSURE: 110 MMHG | HEART RATE: 52 BPM | DIASTOLIC BLOOD PRESSURE: 64 MMHG

## 2025-04-23 DIAGNOSIS — R07.9 CHEST PAIN, UNSPECIFIED TYPE: ICD-10-CM

## 2025-04-23 PROCEDURE — 75574 CT ANGIO HRT W/3D IMAGE: CPT

## 2025-04-23 PROCEDURE — 250N000011 HC RX IP 250 OP 636: Performed by: RADIOLOGY

## 2025-04-23 PROCEDURE — 250N000013 HC RX MED GY IP 250 OP 250 PS 637: Performed by: RADIOLOGY

## 2025-04-23 PROCEDURE — 75574 CT ANGIO HRT W/3D IMAGE: CPT | Mod: 26 | Performed by: RADIOLOGY

## 2025-04-23 RX ORDER — METOPROLOL TARTRATE 25 MG/1
25-100 TABLET, FILM COATED ORAL
Status: DISCONTINUED | OUTPATIENT
Start: 2025-04-23 | End: 2025-04-24 | Stop reason: HOSPADM

## 2025-04-23 RX ORDER — METOPROLOL TARTRATE 1 MG/ML
5-15 INJECTION, SOLUTION INTRAVENOUS
Status: DISCONTINUED | OUTPATIENT
Start: 2025-04-23 | End: 2025-04-24 | Stop reason: HOSPADM

## 2025-04-23 RX ORDER — LIDOCAINE 40 MG/G
CREAM TOPICAL
Status: DISCONTINUED | OUTPATIENT
Start: 2025-04-23 | End: 2025-04-24 | Stop reason: HOSPADM

## 2025-04-23 RX ORDER — ONDANSETRON 2 MG/ML
4 INJECTION INTRAMUSCULAR; INTRAVENOUS
Status: DISCONTINUED | OUTPATIENT
Start: 2025-04-23 | End: 2025-04-24 | Stop reason: HOSPADM

## 2025-04-23 RX ORDER — NITROGLYCERIN 0.4 MG/1
0.4 TABLET SUBLINGUAL
Status: DISCONTINUED | OUTPATIENT
Start: 2025-04-23 | End: 2025-04-24 | Stop reason: HOSPADM

## 2025-04-23 RX ORDER — IOPAMIDOL 755 MG/ML
85 INJECTION, SOLUTION INTRAVASCULAR ONCE
Status: COMPLETED | OUTPATIENT
Start: 2025-04-23 | End: 2025-04-23

## 2025-04-23 RX ADMIN — IOPAMIDOL 85 ML: 755 INJECTION, SOLUTION INTRAVENOUS at 08:11

## 2025-04-23 RX ADMIN — NITROGLYCERIN 0.4 MG: 0.4 TABLET SUBLINGUAL at 08:03

## 2025-04-23 ASSESSMENT — ACTIVITIES OF DAILY LIVING (ADL)
ADLS_ACUITY_SCORE: 41

## 2025-04-23 NOTE — PROGRESS NOTES
Procedure: CTA    # 18 IV started in right AC. Vital signs stable. Heart rhythm sinus lisa high 40's to 50's, no ectopy seen.      Position:  supine    Pain:  0    Nitroglycerin 0.4 mg SL given for procedure given. Slight drop in BP from systolic 120's-110's to 97. No c/o dizziness, denied headache, no skin flushing noted.     Tolerated procedure well.     Condition: Stable    Comments: See dictated procedure note for full details     Discharged to self. Pt walked with staff to entry way, no problems walking, denied any weakness or dizziness.    Gissell Murphy RN

## 2025-04-23 NOTE — DISCHARGE INSTRUCTIONS
Computed Tomography (CT) Scan: About This Test  What is it?  A computed tomography (CT) scan uses X-rays to make detailed pictures of parts of your body and the structures inside your body. During the test, you will lie on a table that is attached to the CT scanner. The CT scanner is a large doughnut-shaped machine.  Why is this test done?  Doctors use CT scans to study areas of the body, such as the brain, chest, belly, spine, bones, or joints. CT scans are also used to assist with or check on the success of a procedure or surgery.  How do you prepare for the test?  In general, there's nothing you have to do before this test, unless your doctor tells you to.  Tell your doctor if you get nervous in tight spaces. You may get a medicine to help you relax. If you think you'll get this medicine, be sure you have someone to take you home.  How is the test done?  Before the test  You may have to take off jewelry.  You will take off all or most of your clothes and change into a gown. If you do leave some clothes on, make sure you take everything out of your pockets.  You may have contrast material (dye) put into your arm through a tube called an I.V.  During the test  You will lie on a table that is attached to the CT scanner.  The table slides into the round opening of the scanner. The table will move during the scan. The scanner moves within the doughnut-shaped casing around your body.  You will be asked to hold still during the scan. You may be asked to hold your breath for short periods.  You may be alone in the scanning room. But a technologist will watch you through a window and talk with you during the test.  How does the test feel?  The test will not cause pain, but some people feel nervous inside the CT scanner.  If a medicine to help you relax (sedative) or dye is used, you may feel a quick sting or pinch when the I.V. is started. The dye may make you feel warm and flushed and give you a metallic taste in your  "mouth. Some people feel sick to their stomach or get a headache. Tell the technologist or your doctor how you are feeling.  How long does the test take?  The test will take about 30 to 60 minutes. Most of this time is spent getting ready for the scan. The actual test takes a few minutes.  What happens after the test?  You will probably be able to go home right away.  You can go back to your usual activities right away.  If dye was used, drink plenty of fluids for 24 hours after the test, unless your doctor tells you not to.  Follow-up care is a key part of your treatment and safety. Be sure to make and go to all appointments, and call your doctor if you are having problems. It's also a good idea to keep a list of the medicines you take. Ask your doctor when you can expect to have your test results.  Where can you learn more?  Go to https://[email or web address].net/patiented  Enter Q352 in the search box to learn more about \"Computed Tomography (CT) Scan: About This Test.\"  Current as of: July 31, 2024  Content Version: 14.4    3091-9972 [employer].   Care instructions adapted under license by your healthcare professional. If you have questions about a medical condition or this instruction, always ask your healthcare professional. [employer] disclaims any warranty or liability for your use of this information.    "

## 2025-04-24 NOTE — PROGRESS NOTES
Assessment & Plan     SOB (shortness of breath)  Ongoing.  Has excellent functional capacity with pickleball, no issues at all.  Has the blockages on CTA through the ER and went to ER with short of breath and chest symptoms kind of like a pressure which sounds like it might not be cardiac overall but we have this data no and have to follow through with it.  See discussion.      Prostate cancer (H)  History of.  No role here.      Abnormal CT of the chest  As above.  Nice discussion.  I explained everything in detail.  See discussion.      Abnormal computed tomography angiography of heart  As above.  No sx     Discussion:  partial blockages on CTA with a stable functional capacity.  He is on a baby asa daily and will continue.  ER with any pain/concerns for MI and we talked about this.  Overall, I'm not even sure he needs an angiogram but it would be nice to get this quantified further.  He does have stable functional capacity.  I reviewed the details with Augie today and answered all questions.          MED REC REQUIRED  Post Medication Reconciliation Status:         Guillaume Ghosh is a 74 year old, presenting for the following health issues:  ER F/U        4/28/2025     9:57 AM   Additional Questions   Roomed by Julia TAYLOR      ED/UC Followup:    [place]  [facility]  Date of visit: 4/6/25  Reason for visit: SOB  Current Status: stable         Review of Systems  Constitutional, HEENT, cardiovascular, pulmonary, gi and gu systems are negative, except as otherwise noted.      Objective    /58   Pulse 72   Temp 96.9  F (36.1  C) (Tympanic)   Wt 63.5 kg (140 lb)   SpO2 96%   BMI 23.30 kg/m    Body mass index is 23.3 kg/m .  Physical Exam   GENERAL: alert and no distress  NECK: no adenopathy, no asymmetry, masses, or scars  RESP: lungs clear to auscultation - no rales, rhonchi or wheezes  CV: regular rate and rhythm, normal S1 S2, no S3 or S4, no murmur, click or rub, no peripheral edema  ABDOMEN: soft,  nontender, no hepatosplenomegaly, no masses and bowel sounds normal  MS: no gross musculoskeletal defects noted, no edema    CTA reviewed in great detail along with CXR results and other workup there.      40 minutes spent with patient and chart, with over 50% in counseling and mostly on answering questions.  We touched on his shoulder pain as well and he will followup for this.  It is better since raking this year.     The longitudinal plan of care for the diagnosis(es)/condition(s) as documented were addressed during this visit. Due to the added complexity in care, I will continue to support Augie in the subsequent management and with ongoing continuity of care.        Signed Electronically by: Jose Guadalupe Cotton MD

## 2025-04-28 ENCOUNTER — OFFICE VISIT (OUTPATIENT)
Dept: FAMILY MEDICINE | Facility: OTHER | Age: 75
End: 2025-04-28
Attending: FAMILY MEDICINE
Payer: COMMERCIAL

## 2025-04-28 VITALS
BODY MASS INDEX: 23.3 KG/M2 | SYSTOLIC BLOOD PRESSURE: 112 MMHG | DIASTOLIC BLOOD PRESSURE: 58 MMHG | OXYGEN SATURATION: 96 % | HEART RATE: 72 BPM | WEIGHT: 140 LBS | TEMPERATURE: 96.9 F

## 2025-04-28 DIAGNOSIS — C61 PROSTATE CANCER (H): ICD-10-CM

## 2025-04-28 DIAGNOSIS — R93.89 ABNORMAL CT OF THE CHEST: ICD-10-CM

## 2025-04-28 DIAGNOSIS — R93.1 ABNORMAL COMPUTED TOMOGRAPHY ANGIOGRAPHY OF HEART: ICD-10-CM

## 2025-04-28 DIAGNOSIS — R06.02 SOB (SHORTNESS OF BREATH): Primary | ICD-10-CM

## 2025-04-28 PROCEDURE — G0463 HOSPITAL OUTPT CLINIC VISIT: HCPCS

## 2025-04-28 ASSESSMENT — PAIN SCALES - GENERAL: PAINLEVEL_OUTOF10: NO PAIN (0)

## 2025-07-06 ENCOUNTER — HOSPITAL ENCOUNTER (EMERGENCY)
Facility: HOSPITAL | Age: 75
Discharge: HOME | End: 2025-07-06
Payer: COMMERCIAL

## 2025-07-06 VITALS
OXYGEN SATURATION: 97 % | DIASTOLIC BLOOD PRESSURE: 76 MMHG | TEMPERATURE: 97.6 F | RESPIRATION RATE: 18 BRPM | SYSTOLIC BLOOD PRESSURE: 128 MMHG | HEART RATE: 61 BPM

## 2025-07-06 DIAGNOSIS — S01.81XA FACIAL LACERATION, INITIAL ENCOUNTER: ICD-10-CM

## 2025-07-06 PROCEDURE — 12011 RPR F/E/E/N/L/M 2.5 CM/<: CPT

## 2025-07-06 PROCEDURE — 90715 TDAP VACCINE 7 YRS/> IM: CPT

## 2025-07-06 PROCEDURE — 250N000011 HC RX IP 250 OP 636

## 2025-07-06 PROCEDURE — 999N000104 HC STATISTIC NO CHARGE

## 2025-07-06 PROCEDURE — 90471 IMMUNIZATION ADMIN: CPT

## 2025-07-06 RX ADMIN — CLOSTRIDIUM TETANI TOXOID ANTIGEN (FORMALDEHYDE INACTIVATED), CORYNEBACTERIUM DIPHTHERIAE TOXOID ANTIGEN (FORMALDEHYDE INACTIVATED), BORDETELLA PERTUSSIS TOXOID ANTIGEN (GLUTARALDEHYDE INACTIVATED), BORDETELLA PERTUSSIS FILAMENTOUS HEMAGGLUTININ ANTIGEN (FORMALDEHYDE INACTIVATED), BORDETELLA PERTUSSIS PERTACTIN ANTIGEN, AND BORDETELLA PERTUSSIS FIMBRIAE 2/3 ANTIGEN 0.5 ML: 5; 2; 2.5; 5; 3; 5 INJECTION, SUSPENSION INTRAMUSCULAR at 16:03

## 2025-07-06 RX ADMIN — LIDOCAINE HYDROCHLORIDE,EPINEPHRINE BITARTRATE 5 ML: 20; .01 INJECTION, SOLUTION INFILTRATION; PERINEURAL at 16:02

## 2025-07-06 ASSESSMENT — ENCOUNTER SYMPTOMS
ACTIVITY CHANGE: 0
WOUND: 1
NUMBNESS: 0
TROUBLE SWALLOWING: 0
FEVER: 0
FACIAL SWELLING: 0
APPETITE CHANGE: 0

## 2025-07-06 ASSESSMENT — ACTIVITIES OF DAILY LIVING (ADL): ADLS_ACUITY_SCORE: 41

## 2025-07-06 NOTE — DISCHARGE INSTRUCTIONS
Suture removal in 5-7 days.   Keep wound clean and dry. Avoid submerging or swimming in lakes/pools until wound is healed and sutures are removed.   Apply triple antibiotic ointment for the next 2-3 days.   Return with any new or concerning symptoms.

## 2025-07-06 NOTE — ED TRIAGE NOTES
Pt presents today with c/o facial laceration.      Triage Assessment (Adult)       Row Name 07/06/25 3978          Triage Assessment    Airway WDL WDL        Respiratory WDL    Respiratory WDL WDL        Peripheral/Neurovascular WDL    Peripheral Neurovascular WDL WDL

## 2025-07-06 NOTE — ED PROVIDER NOTES
History     Chief Complaint   Patient presents with    Laceration     HPI  Augie Hancock is a 74 year old male who presents to the urgent care with complaints of a 1.5cm laceration to left jaw area after the handle of a high lift manual mahendra slipped out of his hands and struck him in the face. He denies jaw pain, loss of consciousness, headaches, and feeling unwell. Takes a daily 81mg ASA, has not had for a few days. Last Tdap 2015.     Allergies:  Allergies   Allergen Reactions    Seasonal Allergies Cough     After being outside/cutting grass       Problem List:    Patient Active Problem List    Diagnosis Date Noted    Strain of right shoulder, initial encounter 08/07/2023     Priority: Medium    Prostate cancer (H) 01/30/2020     Priority: Medium    ACP (advance care planning) 09/15/2016     Priority: Medium     Advance Care Planning 9/15/2016: ACP Review of Chart / Resources Provided:  Reviewed chart for advance care plan.  Augie Hancock has been provided information and resources to begin or update their advance care plan.  Added by Pilar Carbone            Prostate nodule 09/15/2016     Priority: Medium    Astigmatism 07/14/2011     Priority: Medium     Formatting of this note might be different from the original.  IMO Update      Osteoarthrosis involving lower leg 05/06/2011     Priority: Medium     Overview:   IMO Update  IMO Update 10 2016  Replacing diagnoses that were inactivated after the 10/1/2021 regulatory import.          Past Medical History:    Past Medical History:   Diagnosis Date    Femoral fracture 4/18/2011    OA (osteoarthritis) of knee 4/18/2011    Routine general medical examination at a health care facility 5/7/2007    Tibia/fibula fracture 4/18/2011       Past Surgical History:    Past Surgical History:   Procedure Laterality Date    COLONOSCOPY  12/17/2012    Repeat in 10 years    COLONOSCOPY  2004    COLONOSCOPY N/A 12/15/2022    Procedure: COLONOSCOPY;  Surgeon: Nicolás  Sony Eastman MD;  Location: [facility] OR    JOINT REPLACEMENT  4/2011    leg fracture repair secondary to trauma      bilateral    RELEASE CARPAL TUNNEL Right 4/7/2016    Procedure: RELEASE CARPAL TUNNEL;  Surgeon: Moise Izaguirre MD;  Location: [facility] OR       Family History:    Family History   Problem Relation Age of Onset    Cerebrovascular Disease Father     Heart Disease Father 70        MI; cause of death    Coronary Artery Disease Other     Diabetes Brother         type 2    Diabetes Mother         type 2    Diabetes Sister         type 2    Coronary Artery Disease Sister 64        MI       Social History:  Marital Status:   [2]  Social History     Tobacco Use    Smoking status: Never    Smokeless tobacco: Former     Types: Chew    Tobacco comments:     no passive exposure   Vaping Use    Vaping status: Never Used   Substance Use Topics    Alcohol use: No     Alcohol/week: 0.0 standard drinks of alcohol        Medications:    albuterol (PROAIR HFA/PROVENTIL HFA/VENTOLIN HFA) 108 (90 Base) MCG/ACT inhaler  Ascorbic Acid (VITAMIN C) 500 MG CAPS  ASPIRIN PO  GLUCOSAMINE-CHONDROIT-MSM-C-MN PO  UNABLE TO FIND  vitamin D3 (CHOLECALCIFEROL) 2000 units (50 mcg) tablet          Review of Systems   Constitutional:  Negative for activity change, appetite change and fever.   HENT:  Negative for dental problem, facial swelling and trouble swallowing.    Skin:  Positive for wound.   Neurological:  Negative for numbness.   All other systems reviewed and are negative.      Physical Exam   BP: 128/76  Pulse: 61  Temp: 97.6  F (36.4  C)  Resp: 18  SpO2: 97 %      Physical Exam  Vitals and nursing note reviewed.   Constitutional:       General: He is not in acute distress.     Appearance: Normal appearance. He is not ill-appearing or toxic-appearing.   HENT:      Head:      Jaw: Swelling present. No trismus, tenderness or pain on movement.        Mouth/Throat:      Mouth: Mucous membranes are moist.      Pharynx: Oropharynx  is clear. No oropharyngeal exudate or posterior oropharyngeal erythema.   Eyes:      General:         Right eye: No discharge.         Left eye: No discharge.      Extraocular Movements: Extraocular movements intact.      Conjunctiva/sclera: Conjunctivae normal.      Pupils: Pupils are equal, round, and reactive to light.   Musculoskeletal:      Cervical back: No rigidity or tenderness.   Lymphadenopathy:      Cervical: No cervical adenopathy.   Skin:     Findings: Laceration present.   Neurological:      Mental Status: He is alert.         ED Course        [facility]    -Laceration Repair    Date/Time: 7/6/2025 4:48 PM    Performed by: Ivonne Parra NP  Authorized by: Ivonne Parra NP    Risks, benefits and alternatives discussed.      ANESTHESIA (see MAR for exact dosages):     Anesthesia method:  Local infiltration    Local anesthetic:  Lidocaine 2% WITH epi  LACERATION DETAILS     Location:  Face    Face location:  L cheek    Length (cm):  1.5    Depth (mm):  4    REPAIR TYPE:     Repair type:  Simple    EXPLORATION:     Hemostasis achieved with:  Direct pressure    Wound exploration: wound explored through full range of motion and entire depth of wound probed and visualized      Wound extent: areolar tissue not violated, fascia not violated, no foreign body and no vascular damage      Contaminated: no      TREATMENT:     Area cleansed with:  Saline    Amount of cleaning:  Standard    Irrigation solution:  Sterile water    Irrigation volume:  250    Irrigation method:  Tap    Visualized foreign bodies/material removed: no      SKIN REPAIR     Repair method:  Sutures    Suture size:  5-0    Suture material:  Nylon    Suture technique:  Simple interrupted    Number of sutures:  4    APPROXIMATION     Approximation:  Close    POST-PROCEDURE DETAILS     Dressing:  Antibiotic ointment      PROCEDURE    Patient Tolerance:  Patient tolerated the procedure well with no immediate complications      No  results found for this or any previous visit (from the past 24 hours).    Medications   lidocaine 2%-EPINEPHrine 1:100,000 injection 5 mL (5 mLs Intradermal $Given 7/6/25 9091)   Tdap (tetanus-diphtheria-acell pertussis) (ADACEL) injection 0.5 mL (0.5 mLs Intramuscular $Given 7/6/25 3483)       Assessments & Plan (with Medical Decision Making)     I have reviewed the nursing notes.    I have reviewed the findings, diagnosis, plan and need for follow up with the patient.  Augie Hancock is a 74 year old male who presents to the urgent care with complaints of a 1.5cm laceration to left jaw area after the handle of a high lift manual mahendra slipped out of his hands and struck him in the face. He denies jaw pain, loss of consciousness, headaches, and feeling unwell. Takes a daily 81mg ASA, has not had for a few days. Last Tdap 2015.     MDM: vital signs normal, afebrile. Non toxic in appearance with no noted distress. 1.5cm laceration to left lower jaw. Does not go through and through cheek tissue. There is small ~1mm area of bruising to gums. No tenderness on movement of jaw. No trismus. No neck pain. Neuro status intact. Wound repaired with 4 5-0 nylon sutures. Closed wound approximation. Bleeding controlled pre and post laceration repair.Triple antibiotic applied. Tdap updated. Supportive measures and return precautions discussed. She is in agreement with plan.     (S01.81XA) Facial laceration, initial encounter  Plan: Suture removal in 5-7 days.   Keep wound clean and dry. Avoid submerging or swimming in lakes/pools until wound is healed and sutures are removed.   Apply triple antibiotic ointment for the next 2-3 days.   Return with any new or concerning symptoms. Understanding verbalized.     Discharge Medication List as of 7/6/2025  4:26 PM          Final diagnoses:   Facial laceration, initial encounter       7/6/2025   [facility]       Ivonne Parra NP  07/06/25 5500

## 2025-07-14 ENCOUNTER — HOSPITAL ENCOUNTER (EMERGENCY)
Facility: HOSPITAL | Age: 75
Discharge: HOME | End: 2025-07-14
Attending: PHYSICIAN ASSISTANT | Admitting: PHYSICIAN ASSISTANT
Payer: COMMERCIAL

## 2025-07-14 VITALS
HEART RATE: 63 BPM | RESPIRATION RATE: 18 BRPM | SYSTOLIC BLOOD PRESSURE: 117 MMHG | OXYGEN SATURATION: 97 % | DIASTOLIC BLOOD PRESSURE: 64 MMHG | TEMPERATURE: 98 F

## 2025-07-14 DIAGNOSIS — Z48.02 VISIT FOR SUTURE REMOVAL: ICD-10-CM

## 2025-07-14 PROCEDURE — G0463 HOSPITAL OUTPT CLINIC VISIT: HCPCS | Performed by: PHYSICIAN ASSISTANT

## 2025-07-14 PROCEDURE — 99212 OFFICE O/P EST SF 10 MIN: CPT | Performed by: PHYSICIAN ASSISTANT

## 2025-07-14 ASSESSMENT — COLUMBIA-SUICIDE SEVERITY RATING SCALE - C-SSRS
6. HAVE YOU EVER DONE ANYTHING, STARTED TO DO ANYTHING, OR PREPARED TO DO ANYTHING TO END YOUR LIFE?: NO
1. IN THE PAST MONTH, HAVE YOU WISHED YOU WERE DEAD OR WISHED YOU COULD GO TO SLEEP AND NOT WAKE UP?: NO
2. HAVE YOU ACTUALLY HAD ANY THOUGHTS OF KILLING YOURSELF IN THE PAST MONTH?: NO

## 2025-07-14 ASSESSMENT — ENCOUNTER SYMPTOMS: FEVER: 0

## 2025-07-14 NOTE — ED PROVIDER NOTES
History   No chief complaint on file.    HPI  Augie Hancock is a 74 year old male who suture removal out of the left side of his face.  Stitches been in for 8 days.    Allergies:  Allergies   Allergen Reactions    Seasonal Allergies Cough     After being outside/cutting grass       Problem List:    Patient Active Problem List    Diagnosis Date Noted    Strain of right shoulder, initial encounter 08/07/2023     Priority: Medium    Prostate cancer (H) 01/30/2020     Priority: Medium    ACP (advance care planning) 09/15/2016     Priority: Medium     Advance Care Planning 9/15/2016: ACP Review of Chart / Resources Provided:  Reviewed chart for advance care plan.  Augie Hancock has been provided information and resources to begin or update their advance care plan.  Added by Pilar Carbone            Prostate nodule 09/15/2016     Priority: Medium    Astigmatism 07/14/2011     Priority: Medium     Formatting of this note might be different from the original.  IMO Update      Osteoarthrosis involving lower leg 05/06/2011     Priority: Medium     Overview:   IMO Update  IMO Update 10 2016  Replacing diagnoses that were inactivated after the 10/1/2021 regulatory import.          Past Medical History:    Past Medical History:   Diagnosis Date    Femoral fracture 4/18/2011    OA (osteoarthritis) of knee 4/18/2011    Routine general medical examination at a health care facility 5/7/2007    Tibia/fibula fracture 4/18/2011       Past Surgical History:    Past Surgical History:   Procedure Laterality Date    COLONOSCOPY  12/17/2012    Repeat in 10 years    COLONOSCOPY  2004    COLONOSCOPY N/A 12/15/2022    Procedure: COLONOSCOPY;  Surgeon: Sony Monzon MD;  Location: [facility]    JOINT REPLACEMENT  4/2011    leg fracture repair secondary to trauma      bilateral    RELEASE CARPAL TUNNEL Right 4/7/2016    Procedure: RELEASE CARPAL TUNNEL;  Surgeon: Moise Izaguirre MD;  Location: [facility]       Family History:     Family History   Problem Relation Age of Onset    Cerebrovascular Disease Father     Heart Disease Father 70        MI; cause of death    Coronary Artery Disease Other     Diabetes Brother         type 2    Diabetes Mother         type 2    Diabetes Sister         type 2    Coronary Artery Disease Sister 64        MI       Social History:  Marital Status:   [2]  Social History     Tobacco Use    Smoking status: Never    Smokeless tobacco: Former     Types: Chew    Tobacco comments:     no passive exposure   Vaping Use    Vaping status: Never Used   Substance Use Topics    Alcohol use: No     Alcohol/week: 0.0 standard drinks of alcohol        Medications:    albuterol (PROAIR HFA/PROVENTIL HFA/VENTOLIN HFA) 108 (90 Base) MCG/ACT inhaler  Ascorbic Acid (VITAMIN C) 500 MG CAPS  ASPIRIN PO  GLUCOSAMINE-CHONDROIT-MSM-C-MN PO  UNABLE TO FIND  vitamin D3 (CHOLECALCIFEROL) 2000 units (50 mcg) tablet          Review of Systems   Constitutional:  Negative for fever.   All other systems reviewed and are negative.      Physical Exam   BP: 117/64  Pulse: 63  Temp: 98  F (36.7  C)  Resp: 18  SpO2: 97 %      Physical Exam  Vitals and nursing note reviewed.   Constitutional:       Appearance: Normal appearance.   Cardiovascular:      Rate and Rhythm: Normal rate.   Pulmonary:      Effort: Pulmonary effort is normal.   Skin:     Comments: Well-healed laceration around the left side of his chin.  4 sutures in place.   Neurological:      Mental Status: He is alert.   Psychiatric:         Mood and Affect: Mood normal.         Behavior: Behavior normal.         ED Course              No results found for this or any previous visit (from the past 24 hours).    Medications - No data to display    Assessments & Plan (with Medical Decision Making)     I have reviewed the nursing notes.    I have reviewed the findings, diagnosis, plan and need for follow up with the patient.      Medical Decision Making    Patient is a 74-year-old  male here for suture removal.  I had the nurse remove the sutures, patient tolerated well.  Wound is well-healed.  No sign of complication or infection.      New Prescriptions    No medications on file       Final diagnoses:   None       7/14/2025   [facility]       Asa Ho PA-C  07/14/25 [id number]

## 2025-08-14 ENCOUNTER — DOCUMENTATION ONLY (OUTPATIENT)
Dept: OTHER | Facility: CLINIC | Age: 75
End: 2025-08-14

## (undated) DEVICE — CONNECTOR ERBEFLO 2 PORT 20325-215

## (undated) DEVICE — CANISTER SUCTION MEDI-VAC GUARDIAN 2000ML 90D 65651-220

## (undated) DEVICE — TUBING SUCTION 20FT N620A

## (undated) DEVICE — SOL WATER IRRIG 1000ML BOTTLE 2F7114